# Patient Record
Sex: FEMALE | Race: WHITE
[De-identification: names, ages, dates, MRNs, and addresses within clinical notes are randomized per-mention and may not be internally consistent; named-entity substitution may affect disease eponyms.]

---

## 2021-03-26 ENCOUNTER — HOSPITAL ENCOUNTER (INPATIENT)
Dept: HOSPITAL 63 - GEROPSY | Age: 71
LOS: 28 days | Discharge: NURSING, REHAB OR LONG-TERM CARE | DRG: 57 | End: 2021-04-23
Attending: PSYCHIATRY & NEUROLOGY | Admitting: PSYCHIATRY & NEUROLOGY
Payer: MEDICARE

## 2021-03-26 VITALS — WEIGHT: 109.13 LBS | HEIGHT: 66 IN | BODY MASS INDEX: 17.54 KG/M2

## 2021-03-26 VITALS — SYSTOLIC BLOOD PRESSURE: 104 MMHG | DIASTOLIC BLOOD PRESSURE: 82 MMHG

## 2021-03-26 DIAGNOSIS — F41.1: ICD-10-CM

## 2021-03-26 DIAGNOSIS — Z87.891: ICD-10-CM

## 2021-03-26 DIAGNOSIS — F63.9: ICD-10-CM

## 2021-03-26 DIAGNOSIS — G30.9: Primary | ICD-10-CM

## 2021-03-26 DIAGNOSIS — G91.2: ICD-10-CM

## 2021-03-26 DIAGNOSIS — F01.51: ICD-10-CM

## 2021-03-26 DIAGNOSIS — F90.9: ICD-10-CM

## 2021-03-26 DIAGNOSIS — F02.81: ICD-10-CM

## 2021-03-26 DIAGNOSIS — G10: ICD-10-CM

## 2021-03-26 DIAGNOSIS — E03.9: ICD-10-CM

## 2021-03-26 DIAGNOSIS — Z20.822: ICD-10-CM

## 2021-03-26 DIAGNOSIS — Z79.899: ICD-10-CM

## 2021-03-26 DIAGNOSIS — Z59.0: ICD-10-CM

## 2021-03-26 DIAGNOSIS — F31.60: ICD-10-CM

## 2021-03-26 DIAGNOSIS — R29.6: ICD-10-CM

## 2021-03-26 DIAGNOSIS — E05.90: ICD-10-CM

## 2021-03-26 DIAGNOSIS — J43.9: ICD-10-CM

## 2021-03-26 PROCEDURE — 82306 VITAMIN D 25 HYDROXY: CPT

## 2021-03-26 PROCEDURE — 84436 ASSAY OF TOTAL THYROXINE: CPT

## 2021-03-26 PROCEDURE — 83550 IRON BINDING TEST: CPT

## 2021-03-26 PROCEDURE — 80061 LIPID PANEL: CPT

## 2021-03-26 PROCEDURE — 80164 ASSAY DIPROPYLACETIC ACD TOT: CPT

## 2021-03-26 PROCEDURE — 87086 URINE CULTURE/COLONY COUNT: CPT

## 2021-03-26 PROCEDURE — 84480 ASSAY TRIIODOTHYRONINE (T3): CPT

## 2021-03-26 PROCEDURE — 86592 SYPHILIS TEST NON-TREP QUAL: CPT

## 2021-03-26 PROCEDURE — 82607 VITAMIN B-12: CPT

## 2021-03-26 PROCEDURE — 83036 HEMOGLOBIN GLYCOSYLATED A1C: CPT

## 2021-03-26 PROCEDURE — 85025 COMPLETE CBC W/AUTO DIFF WBC: CPT

## 2021-03-26 PROCEDURE — 85379 FIBRIN DEGRADATION QUANT: CPT

## 2021-03-26 PROCEDURE — 81001 URINALYSIS AUTO W/SCOPE: CPT

## 2021-03-26 PROCEDURE — 85027 COMPLETE CBC AUTOMATED: CPT

## 2021-03-26 PROCEDURE — U0003 INFECTIOUS AGENT DETECTION BY NUCLEIC ACID (DNA OR RNA); SEVERE ACUTE RESPIRATORY SYNDROME CORONAVIRUS 2 (SARS-COV-2) (CORONAVIRUS DISEASE [COVID-19]), AMPLIFIED PROBE TECHNIQUE, MAKING USE OF HIGH THROUGHPUT TECHNOLOGIES AS DESCRIBED BY CMS-2020-01-R: HCPCS

## 2021-03-26 PROCEDURE — 83735 ASSAY OF MAGNESIUM: CPT

## 2021-03-26 PROCEDURE — 70450 CT HEAD/BRAIN W/O DYE: CPT

## 2021-03-26 PROCEDURE — 84443 ASSAY THYROID STIM HORMONE: CPT

## 2021-03-26 PROCEDURE — 80053 COMPREHEN METABOLIC PANEL: CPT

## 2021-03-26 PROCEDURE — 83540 ASSAY OF IRON: CPT

## 2021-03-26 PROCEDURE — 36415 COLL VENOUS BLD VENIPUNCTURE: CPT

## 2021-03-26 PROCEDURE — 84439 ASSAY OF FREE THYROXINE: CPT

## 2021-03-26 PROCEDURE — 87077 CULTURE AEROBIC IDENTIFY: CPT

## 2021-03-26 SDOH — ECONOMIC STABILITY - HOUSING INSECURITY: HOMELESSNESS: Z59.0

## 2021-03-26 NOTE — NUR
Admission Note with Justification for Admission to [facility]

Patient admitted to [facility] for protective oversight for emergency stabilization of acute 
psychiatric crisis.



Pt admitted from: [facility]



Mode of arrival: Facility Transport



Accompanied By: Transport staff



Precipitating behaviors that initiated intake and admission: pt belligerent, refusing 
cares/showers, becoming combative and swinging at staff, agitated, anxious, verbally 
abusive, and name calling.



Description of failure of out patient attempts at stabilization in previous setting list 
behavior and medication trials: "staff distances themselves" from pt according to intake, 
outpatient psych.



Behaviors and assessment findings upon admission: Pt agitated, resistive with staff attempts 
to provide care. Pt incontinent upon arrival and when staff was attempting to provide care, 
pt kicking and attempting to strike staff. Once pt was escorted to the bathroom, it was 
noted that pt brief was completely saturated with urine and feces. Pt had to be escorted to 
the shower in order to provide tate care d/t her being so uncooperative. Staff x3 needed in 
order to complete shower and dressing. Afterwards, pt escorted to [facility] for 
de-escalation and Dr. Freddie lange for new orders.



Plan: Admit for protective oversight for adjustment and stabilization of medications, 
behaviors and mood.  Intense treatment regimen including groups, medication adjustments, 
therapy, consistent regimen for ADL's, self care, and sleep hygiene. Daily monitoring by 
Inpatient staff, Psychiatry, and Medical Physician.

## 2021-03-27 VITALS — SYSTOLIC BLOOD PRESSURE: 100 MMHG | DIASTOLIC BLOOD PRESSURE: 66 MMHG

## 2021-03-27 LAB
ALBUMIN SERPL-MCNC: 3.6 G/DL (ref 3.4–5)
ALBUMIN/GLOB SERPL: 0.8 {RATIO} (ref 1–1.7)
ALP SERPL-CCNC: 97 U/L (ref 46–116)
ALT SERPL-CCNC: 22 U/L (ref 14–59)
AMORPH SED URNS QL MICRO: PRESENT /HPF
ANION GAP SERPL CALC-SCNC: 12 MMOL/L (ref 6–14)
APTT PPP: (no result) S
AST SERPL-CCNC: 16 U/L (ref 15–37)
BACTERIA #/AREA URNS HPF: (no result) /HPF
BASOPHILS # BLD AUTO: 0 X10^3/UL (ref 0–0.2)
BASOPHILS NFR BLD: 0 % (ref 0–3)
BILIRUB SERPL-MCNC: 0.4 MG/DL (ref 0.2–1)
BILIRUB UR QL STRIP: (no result)
BUN/CREAT SERPL: 24 (ref 6–20)
CA-I SERPL ISE-MCNC: 24 MG/DL (ref 7–20)
CALCIUM SERPL-MCNC: 10 MG/DL (ref 8.5–10.1)
CHLORIDE SERPL-SCNC: 111 MMOL/L (ref 98–107)
CHOLEST/HDLC SERPL: 4 {RATIO}
CO2 SERPL-SCNC: 25 MMOL/L (ref 21–32)
CREAT SERPL-MCNC: 1 MG/DL (ref 0.6–1)
EOSINOPHIL NFR BLD: 0.1 X10^3/UL (ref 0–0.7)
EOSINOPHIL NFR BLD: 2 % (ref 0–3)
ERYTHROCYTE [DISTWIDTH] IN BLOOD BY AUTOMATED COUNT: 13.8 % (ref 11.5–14.5)
FIBRINOGEN PPP-MCNC: (no result) MG/DL
GFR SERPLBLD BASED ON 1.73 SQ M-ARVRAT: 54.8 ML/MIN
GLOBULIN SER-MCNC: 4.3 G/DL (ref 2.2–3.8)
GLUCOSE SERPL-MCNC: 93 MG/DL (ref 70–99)
GLUCOSE UR STRIP-MCNC: (no result) MG/DL
HCT VFR BLD CALC: 44.2 % (ref 36–47)
HDLC SERPL-MCNC: 52 MG/DL (ref 40–60)
HGB BLD-MCNC: 14.2 G/DL (ref 12–15.5)
LDLC: 144 MG/DL (ref 0–100)
LYMPHOCYTES # BLD: 2.3 X10^3/UL (ref 1–4.8)
LYMPHOCYTES NFR BLD AUTO: 27 % (ref 24–48)
MAGNESIUM SERPL-MCNC: 1.8 MG/DL (ref 1.8–2.4)
MCH RBC QN AUTO: 31 PG (ref 25–35)
MCHC RBC AUTO-ENTMCNC: 32 G/DL (ref 31–37)
MCV RBC AUTO: 97 FL (ref 79–100)
MONO #: 0.6 X10^3/UL (ref 0–1.1)
MONOCYTES NFR BLD: 7 % (ref 0–9)
NEUT #: 5.5 X10^3UL (ref 1.8–7.7)
NEUTROPHILS NFR BLD AUTO: 64 % (ref 31–73)
NITRITE UR QL STRIP: (no result)
PLATELET # BLD AUTO: 223 X10^3/UL (ref 140–400)
POTASSIUM SERPL-SCNC: 4.1 MMOL/L (ref 3.5–5.1)
PROT SERPL-MCNC: 7.9 G/DL (ref 6.4–8.2)
RBC # BLD AUTO: 4.54 X10^6/UL (ref 3.5–5.4)
RBC #/AREA URNS HPF: (no result) /HPF (ref 0–2)
SODIUM SERPL-SCNC: 148 MMOL/L (ref 136–145)
SP GR UR STRIP: 1.02
SQUAMOUS #/AREA URNS LPF: (no result) /LPF
TRIGL SERPL-MCNC: 87 MG/DL (ref 0–150)
UROBILINOGEN UR-MCNC: 0.2 MG/DL
VLDLC: 17 MG/DL (ref 0–40)
WBC # BLD AUTO: 8.6 X10^3/UL (ref 4–11)
WBC #/AREA URNS HPF: (no result) /HPF (ref 0–4)

## 2021-03-27 RX ADMIN — LEVOTHYROXINE SODIUM SCH MCG: 88 TABLET ORAL at 04:40

## 2021-03-27 RX ADMIN — OXYCODONE HYDROCHLORIDE AND ACETAMINOPHEN SCH MG: 500 TABLET ORAL at 08:22

## 2021-03-27 RX ADMIN — BUSPIRONE HYDROCHLORIDE SCH MG: 5 TABLET ORAL at 19:34

## 2021-03-27 RX ADMIN — QUETIAPINE FUMARATE SCH MG: 50 TABLET, FILM COATED ORAL at 08:22

## 2021-03-27 RX ADMIN — NICOTINE SCH PATCH: 14 PATCH, EXTENDED RELEASE TOPICAL at 08:22

## 2021-03-27 RX ADMIN — BUSPIRONE HYDROCHLORIDE SCH MG: 5 TABLET ORAL at 08:22

## 2021-03-27 RX ADMIN — QUETIAPINE FUMARATE SCH MG: 50 TABLET, FILM COATED ORAL at 19:34

## 2021-03-27 RX ADMIN — MULTIPLE VITAMINS W/ MINERALS TAB SCH TAB: TAB at 08:22

## 2021-03-27 RX ADMIN — TRAZODONE HYDROCHLORIDE PRN MG: 50 TABLET, FILM COATED ORAL at 19:35

## 2021-03-27 RX ADMIN — BUSPIRONE HYDROCHLORIDE SCH MG: 5 TABLET ORAL at 12:16

## 2021-03-27 NOTE — NUR
Patient took medications whole, also is calm while doing blood draw. Patient has rapid 
abnormal body movements that appears unwilling. Dr. Benjamin paged for consult.

## 2021-03-27 NOTE — NUR
Pt sitting quietly in the hallway when approached. Pt calm, disorganized, and delusional- 
she reports that she is leaving tonight. Pt mood extremely labile, she is very restless with 
severe ataxia. Pt cooperative with assessment and compliant with medications administered 
whole, however resistive and uncooperative when staff attempted to escort her to her room to 
obtain an EKG.

## 2021-03-27 NOTE — PSYEV
DATE OF SERVICE:  03/26/2021



PSYCHIATRIC EVALUATION



SUBJECTIVE:  The patient was seen today, met with the staff, and also covering

for Dr. Frazier.  The patient was admitted from [facility] because of increased

agitation, aggression, combative, and striking out at staff.  She also has been

verbally abusive, belligerent, refusing to take showers.



CHIEF COMPLAINT:  "I don't know why I am here".



HISTORY OF PRESENT ILLNESS:  The patient apparently has been a resident at

[facility] and she has multiple problems including physical and also

psychiatric issues including dementia, gait problems and also involuntary

movements.  The patient is unable to sit still.  The patient is also having

increased __ involuntary movements and also involuntary movements of upper and

lower extremities.  The patient also has explosive speech.  The patient during

the assessment was pleasant, did not have any major complaints.  The patient is

having difficulty communicating her feelings, appears to be pleasant, denies of

depressed, admits to increased anxiety and also lacking insight to her problems.

 The patient also has a history of delusional behaviors in the past.



PAST MEDICAL HISTORY:  History of emphysema, gait impairment, hypothyroidism and

involuntary movements and movement disorder.



PAST PSYCHIATRIC HISTORY:  The patient was not able to give any information with

regard to her past whether she has been treated in psychiatric facility or not,

but she carries a diagnosis of dementia, Alzheimer's type, generalized anxiety

disorder and also bipolar disorder.



PSYCHOSOCIAL HISTORY:  She has been a resident at the nursing home for several

years.  Apparently, she has one son.  The patient has also history of smoking

and no evidence of any alcoholism or substance abuse.  The patient was not able

to talk about her family.



MENTAL STATUS EXAMINATION:  The patient appeared to be of her stated age,

pleasant, somewhat confused, but able to make eye contact.  The patient had

considerable difficulty with her speech because of her involuntary movements. 

She has an explosive speech, able to answer a few questions.  She knew that this

is March and the year 2021 and she knew she was in the hospital, but could not

remember the name except for stating "Alexy".  The patient was able to sit

through the assessment, but not able to give much information.  The patient gets

excited easily, pressured speech, racing thoughts.  Affect and mood showed she

is not depressed, but is anxious, increased psychomotor activity.  She denies of

any suicidal thoughts, currently not exhibiting any psychotic symptoms.  The

patient is oriented to surroundings, knew she was in the hospital and knew the

month and year.  The patient's memory is not testable because of the patient's

inability to answer questions.  The patient's judgment is fair and insight

limited.



DIAGNOSTIC IMPRESSION:

AXIS I:

1.  Bipolar disorder, mixed.

2.  Dementia, mild unspecified.

3.  Generalized anxiety disorder.

AXIS II:  None.

AXIS III:  Emphysema, hypothyroidism, movement disorder.



STRENGTHS:  Pleasant, able to walk without support.



WEAKNESSES:  The patient has limitations with regard to her communication and

also movements because of the movement disorder.



INITIAL TREATMENT PLAN:  The patient will be under observation.  The patient

will continue on her current medications including BuSpar 5 mg t.i.d., Seroquel

50 mg b.i.d., Zyprexa 2.5 mg p.r.n. q. 2 hours and also trazodone 50 mg at night

p.r.n.  The patient is encouraged to attend all the activities, including

individual and group counseling activities.



LENGTH OF STAY:  7 days.



DISCHARGE CRITERIA:  The patient at least to show some improvement without any

major behavior problems for 3 consecutive days and will return to the nursing

home that she came from.





______________________________

DARLINE SHERMAN MD DR:  ERNESTO/willow  JOB#:  [id number] / [id number]

DD:  03/27/2021 16:35  DT:  03/27/2021 18:04

USMAN

## 2021-03-28 VITALS — DIASTOLIC BLOOD PRESSURE: 57 MMHG | SYSTOLIC BLOOD PRESSURE: 93 MMHG

## 2021-03-28 VITALS — SYSTOLIC BLOOD PRESSURE: 111 MMHG | DIASTOLIC BLOOD PRESSURE: 53 MMHG

## 2021-03-28 LAB
HBA1C MFR BLD: 5.4 % (ref 4.8–5.6)
T3 SERPL-MCNC: 117 NG/DL (ref 71–180)
T4 SERPL-MCNC: 10.3 UG/DL (ref 4.5–12)

## 2021-03-28 RX ADMIN — NICOTINE SCH PATCH: 14 PATCH, EXTENDED RELEASE TOPICAL at 08:36

## 2021-03-28 RX ADMIN — QUETIAPINE FUMARATE SCH MG: 50 TABLET, FILM COATED ORAL at 21:02

## 2021-03-28 RX ADMIN — TRAZODONE HYDROCHLORIDE PRN MG: 50 TABLET, FILM COATED ORAL at 21:02

## 2021-03-28 RX ADMIN — BUSPIRONE HYDROCHLORIDE SCH MG: 5 TABLET ORAL at 15:07

## 2021-03-28 RX ADMIN — BUSPIRONE HYDROCHLORIDE SCH MG: 5 TABLET ORAL at 08:36

## 2021-03-28 RX ADMIN — MULTIPLE VITAMINS W/ MINERALS TAB SCH TAB: TAB at 08:36

## 2021-03-28 RX ADMIN — LEVOTHYROXINE SODIUM SCH MCG: 88 TABLET ORAL at 05:21

## 2021-03-28 RX ADMIN — BUSPIRONE HYDROCHLORIDE SCH MG: 5 TABLET ORAL at 21:02

## 2021-03-28 RX ADMIN — OXYCODONE HYDROCHLORIDE AND ACETAMINOPHEN SCH MG: 500 TABLET ORAL at 08:36

## 2021-03-28 RX ADMIN — QUETIAPINE FUMARATE SCH MG: 50 TABLET, FILM COATED ORAL at 08:36

## 2021-03-28 NOTE — PN
DATE:  03/28/2021



SUBJECTIVE:  The patient was seen today, met with the staff, chart reviewed and

also covering for Dr. Frazier.  The patient's behavior remains the same.  She is

hyperactive, restless, and generalized involuntary movements.  The patient also

has significant speech impediment.  The patient was seen by the neurologist

today and will be requesting a CT scan, but the patient may have difficulty

going through the procedure because of her continuous involuntary movements.



OBSERVATION:  Temperature 97.8, blood pressure 111/53, pulse 95, respirations

18, O2 sat 99%.  Slept about 4 hours last night.



CURRENT MEDICATIONS:  Include Seroquel 50 mg twice a day, BuSpar 5 mg t.i.d. and

trazodone 50 mg at night p.r.n. for sleep.  She is also on olanzapine 2.5 mg q.2

hours p.r.n. for psychosis.



LABORATORY DATA:  The patient's labs were reviewed.



ASSESSMENT:

1.  Bipolar disorder, mixed.

2.  Dementia, mild, unspecified.

3.  Generalized anxiety disorder.



PLAN:  To continue with the current treatment.



LENGTH OF STAY:  10 days.





______________________________

DARLINE SHERMAN MD



DR:  ERNESTO/willow  JOB#:  [id number] / [id number]

DD:  03/28/2021 16:19  DT:  03/28/2021 17:05

USMAN

## 2021-03-28 NOTE — NUR
PT in room being changed in bed at time of assessment and medication administration. PT 
noted with new reddened/scabbed area noted to RT lateral lower buttock. Continued noted 
reddened/scabbed area to coccyx. Barrier cream applied. PT compliant with assessment and 
medications. PT took whole with water. Photo taken but PT moving constantly, unable to use 
stickers.

## 2021-03-28 NOTE — NUR
Pt has low TSH level of 0.018. Am Levothyroxine 88mcg held at this time and pt added to MD 
list for today in order to address. Will pass on to the next shift.

## 2021-03-28 NOTE — NUR
PT IS LOCATED IN DINING ROOM AT TIME OF ASSESSMENT AND MEDICATION ADMINISTRATION. PT IS 
PLEASANT AND COOPERATIVE. PT DOES HAVE VAHE SPASTIC MOVEMENT. PT HAS A CT SCHEDULE FOR THIS 
AM. WILL CONTINUE TO MONITOR.

## 2021-03-29 VITALS — SYSTOLIC BLOOD PRESSURE: 116 MMHG | DIASTOLIC BLOOD PRESSURE: 62 MMHG

## 2021-03-29 RX ADMIN — MULTIPLE VITAMINS W/ MINERALS TAB SCH TAB: TAB at 10:32

## 2021-03-29 RX ADMIN — TRAZODONE HYDROCHLORIDE PRN MG: 50 TABLET, FILM COATED ORAL at 21:00

## 2021-03-29 RX ADMIN — OXYCODONE HYDROCHLORIDE AND ACETAMINOPHEN SCH MG: 500 TABLET ORAL at 10:32

## 2021-03-29 RX ADMIN — NICOTINE SCH PATCH: 14 PATCH, EXTENDED RELEASE TOPICAL at 10:36

## 2021-03-29 RX ADMIN — BUSPIRONE HYDROCHLORIDE SCH MG: 5 TABLET ORAL at 21:00

## 2021-03-29 RX ADMIN — BUSPIRONE HYDROCHLORIDE SCH MG: 5 TABLET ORAL at 14:00

## 2021-03-29 RX ADMIN — LEVOTHYROXINE SODIUM SCH MCG: 50 TABLET ORAL at 05:48

## 2021-03-29 RX ADMIN — AMOXICILLIN SCH MG: 250 CAPSULE ORAL at 21:00

## 2021-03-29 RX ADMIN — BUSPIRONE HYDROCHLORIDE SCH MG: 5 TABLET ORAL at 10:32

## 2021-03-29 RX ADMIN — QUETIAPINE FUMARATE SCH MG: 50 TABLET, FILM COATED ORAL at 21:00

## 2021-03-29 RX ADMIN — LEVOTHYROXINE SODIUM SCH MCG: 50 TABLET ORAL at 05:39

## 2021-03-29 RX ADMIN — QUETIAPINE FUMARATE SCH MG: 50 TABLET, FILM COATED ORAL at 10:32
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
## 2021-03-29 NOTE — NUR
PATIENT WAS AWAKE IN A DAY ROOM EATING HER BREAKFAST UPON ASSESSMENT, COMPLIANT WITH 
MEDICATIONS. PATIENT HAS HYPERACTIVE ABNORMAL MOVEMENTS, ABLE TO AMBULATE AND FEED SELF 
UNDER SUPERVISION. PATIENT IS GETTING IMPULSIVE AND IRRITABLE AT TIMES. PATIENT WENT TO HER 
ROOM, ASKED ABOUT HAVING TV IN A ROOM, PATIENT WAS INFORMED NO TV AVAILABLE IN ANY ROOM 
EXCEPT THE DAY ROOM. PATIENT REFUSED TO GO TO THE DAY ROOM. PATIENT IS CURRENTLY IN A BED 
RESTING.

## 2021-03-29 NOTE — NUR
Patient has been provided with Practical Counseling for tobacco cessation. It included a 
face to face interaction and the following was discussed: Recognizing danger situations, 
Developing coping skills,Basic cessation information. Will follow for discharge needs and 
discharge planning. Therapist could never do a smoking cessation on the patient.  We have 
asked nurse to help with this situation.  Patient was non compliant a couple of times that 
different therapist have tried
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
## 2021-03-29 NOTE — NUR
ACTIVITY THERAPY ASSESSMENT

completed based on notes, observation and interview. Pt was laying in her bed with her face 
in the pillow. Pt was resistive and 

unwilling to answer questions. Pt did answer a couple of questions but it was inaudible as 
she had her face in the pillow and 

became agitated when AT would ask her to repeat it. AT is unsure if pt became agitated and 
pt  intentionally made a fast 

movement towards AT as she raised her voice or if it may have been involuntary. Per notes pt 
has appeared to make involuntary

movements sense admission. Per notes pt likes crafts, ceramics, interior decorating and 
painting. Pt has a history of alcohol abuse and inability to take care of herself. Initial 
goal aimed to increase socialization and engagement. Pt will participate in at least one 
individual or group Activity Therapy session before discharge.

-------------------------------------------------------------------------------

Addendum: 04/05/21 at 1317 by RICKY ENWMAN ACT

-------------------------------------------------------------------------------

Goal changed 4/5: Pt will participate in at least three individual or group Activity Therapy 
session per week.

-------------------------------------------------------------------------------

Addendum: 04/19/21 at 1359 by RICKY NEWMAN ACT

-------------------------------------------------------------------------------

Goal repeated 4/19

## 2021-03-29 NOTE — NUR
WEEKLY ACTIVITY THERAPY NOTE

Date of Admission: 3/26/21

Date of AT Assessment: TBD

Precipitating behaviors that initiated intake and admission: pt belligerent, refusing 
cares/showers, becoming combative and swinging at staff, agitated, anxious, verbally 
abusive, and name calling.

Goal aimed: TBD

Initial Goal: TBD

Weekly progress towards goal: NA

Group participation level: NA

Weekly highlights: arrived on [facility]

Behaviors observed: 

Plan: meet/assess pt

Beneficial adaptations:

## 2021-03-30 VITALS — SYSTOLIC BLOOD PRESSURE: 117 MMHG | DIASTOLIC BLOOD PRESSURE: 75 MMHG

## 2021-03-30 VITALS — SYSTOLIC BLOOD PRESSURE: 120 MMHG | DIASTOLIC BLOOD PRESSURE: 73 MMHG

## 2021-03-30 RX ADMIN — BUSPIRONE HYDROCHLORIDE SCH MG: 5 TABLET ORAL at 19:43

## 2021-03-30 RX ADMIN — AMOXICILLIN SCH MG: 250 CAPSULE ORAL at 09:32

## 2021-03-30 RX ADMIN — AMOXICILLIN SCH MG: 250 CAPSULE ORAL at 14:08

## 2021-03-30 RX ADMIN — OXYCODONE HYDROCHLORIDE AND ACETAMINOPHEN SCH MG: 500 TABLET ORAL at 09:31

## 2021-03-30 RX ADMIN — BUSPIRONE HYDROCHLORIDE SCH MG: 5 TABLET ORAL at 09:31

## 2021-03-30 RX ADMIN — QUETIAPINE FUMARATE SCH MG: 50 TABLET, FILM COATED ORAL at 09:31

## 2021-03-30 RX ADMIN — MULTIPLE VITAMINS W/ MINERALS TAB SCH TAB: TAB at 09:31

## 2021-03-30 RX ADMIN — AMOXICILLIN SCH MG: 250 CAPSULE ORAL at 19:42

## 2021-03-30 RX ADMIN — BUSPIRONE HYDROCHLORIDE SCH MG: 5 TABLET ORAL at 14:09

## 2021-03-30 RX ADMIN — TRAZODONE HYDROCHLORIDE PRN MG: 50 TABLET, FILM COATED ORAL at 19:44

## 2021-03-30 RX ADMIN — NICOTINE SCH PATCH: 14 PATCH, EXTENDED RELEASE TOPICAL at 09:00

## 2021-03-30 RX ADMIN — QUETIAPINE FUMARATE SCH MG: 50 TABLET, FILM COATED ORAL at 19:42

## 2021-03-30 RX ADMIN — LEVOTHYROXINE SODIUM SCH MCG: 50 TABLET ORAL at 05:11

## 2021-03-30 NOTE — NUR
Nursing Note:  Pt sitting on side of bed during assessment and med pass.  Pt pleasant and 
compliant with medications.

## 2021-03-30 NOTE — PN
DATE:  03/29/2021



SUBJECTIVE:  The patient was seen today, met with the staff, chart reviewed,

also covering for Dr. Frazier.  The patient continues to exhibit significant

involuntary movements and also staff reports the patient has not had any

long-term exposure to neuroleptics because she has been exhibiting significant

involuntary movements and also orofacial dyskinesia.  The patient also has been

homeless in the past and history of alcoholism.  The patient apparently saw a

neurologist in [place] and also had an MRI and we are requesting for the

reports.



OBSERVATION:

VITAL SIGNS:  Temperature 97, pulse 69, respirations 24, O2 sat 95%.

GENERAL:  Slept about 7 hours last night.  The patient's appetite is fair.



MEDICATIONS:  The patient's current medications include Seroquel 50 mg twice a

day, BuSpar 5 mg t.i.d. and trazodone 50 mg at night p.r.n.



LAB REVIEW:  No significant change from prior readings.



ASSESSMENT:

1.  Bipolar disorder, mixed.

2.  Dementia, mild unspecified.

3.  Generalized anxiety disorder.



PLAN:  To continue with the treatment.



LENGTH OF STAY:  Ten days.





______________________________

DARLINE SHERMNA MD



DR:  ERNESTO/willow  JOB#:  [id number] / [id number]

DD:  03/29/2021 17:01  DT:  03/30/2021 00:25

## 2021-03-30 NOTE — NUR
Patient has been calm, compliant, pleasant, interactive, and social throughout this shift.  
She spent some time withdrawn to her room in the morning, otherwise spent most of the shift 
in the day room.  She was social with peers and staff.  Patient has garbled speech and 
irregular movements similar to Curlew's.  Will continue to monitor and report to 
oncoming shift.

## 2021-03-30 NOTE — NUR
Nursing Note

Pt in day room, has a severe ataxic gait, flails arms around walks around the day room with 
a specific pattern to her gait.  Pt states her name is Sofía, but then trails off and 
rambling.  Med compliant and cooperative.

## 2021-03-31 VITALS — DIASTOLIC BLOOD PRESSURE: 53 MMHG | SYSTOLIC BLOOD PRESSURE: 88 MMHG

## 2021-03-31 VITALS — SYSTOLIC BLOOD PRESSURE: 127 MMHG | DIASTOLIC BLOOD PRESSURE: 82 MMHG

## 2021-03-31 RX ADMIN — BUSPIRONE HYDROCHLORIDE SCH MG: 5 TABLET ORAL at 08:41

## 2021-03-31 RX ADMIN — AMOXICILLIN SCH MG: 250 CAPSULE ORAL at 08:41

## 2021-03-31 RX ADMIN — OXYCODONE HYDROCHLORIDE AND ACETAMINOPHEN SCH MG: 500 TABLET ORAL at 08:41

## 2021-03-31 RX ADMIN — BUSPIRONE HYDROCHLORIDE SCH MG: 5 TABLET ORAL at 19:51

## 2021-03-31 RX ADMIN — LEVOTHYROXINE SODIUM SCH MCG: 50 TABLET ORAL at 05:40

## 2021-03-31 RX ADMIN — AMOXICILLIN SCH MG: 250 CAPSULE ORAL at 19:51

## 2021-03-31 RX ADMIN — QUETIAPINE FUMARATE SCH MG: 50 TABLET, FILM COATED ORAL at 08:41

## 2021-03-31 RX ADMIN — TRAZODONE HYDROCHLORIDE PRN MG: 50 TABLET, FILM COATED ORAL at 19:54

## 2021-03-31 RX ADMIN — QUETIAPINE FUMARATE SCH MG: 50 TABLET, FILM COATED ORAL at 19:51

## 2021-03-31 RX ADMIN — BUSPIRONE HYDROCHLORIDE SCH MG: 5 TABLET ORAL at 14:03

## 2021-03-31 RX ADMIN — AMOXICILLIN SCH MG: 250 CAPSULE ORAL at 14:03

## 2021-03-31 RX ADMIN — MULTIPLE VITAMINS W/ MINERALS TAB SCH TAB: TAB at 08:41

## 2021-03-31 RX ADMIN — NICOTINE SCH PATCH: 14 PATCH, EXTENDED RELEASE TOPICAL at 08:42

## 2021-03-31 NOTE — NUR
Patient has been agitated, resistive to medications, with outbursts of anger today.  She was 
yelling loudly at about lunch time saying that she did not want to stay in the hospital. She 
was instructed to return to her room as she was upsetting other patients, she refused.  When 
staff attempted to escort her to her room, she lay on the floor and was combative, hitting 
and kicking staff.  PRN medication provided per eMAR and patient was escorted to her room.  
Patient stayed in her room and was cooperative with 14:00 medications, then suddenly through 
the water cup and started bouncing up and down on her bed, swinging her arms and kicking 
out.  Patient stayed in her room until later when she was sitting calmly in the [address] 
during MD's rounds.  Will continue to monitor and report to oncoming staff.

## 2021-03-31 NOTE — NUR
Nursing Note

Pt initially was compliant with assessment but soon became irritable.  Pt using extremely 
foul language toward nursing, when refusing her meds.  She used multiple profane terms to 
describe me when she realized I was taking her power aid from her.  Pt sits at the  
desk window continuing to swear and be belligerent. Went to bed with no meds.

## 2021-03-31 NOTE — PN
DATE:  03/30/2021



SUBJECTIVE:  The patient was seen today, met with the staff, chart reviewed and

also covering for Dr. Frazier.  The patient is exhibiting increased agitation,

aggression, and combative behaviors including striking at staff:



OBSERVATION: 

VITAL SIGNS:  Temperature 97.7, blood pressure 120/73, respirations 18, pulse

78, O2 sat 96%.

GENERAL:  Slept about 7 hours last night.  The patient continues to have

increased involuntary movements and was seen by the neurologist.



CURRENT MEDICATIONS:  Include  Seroquel 50 mg twice a day, BuSpar 5 mg t.i.d.,

trazodone 50 mg at night, and also olanzapine 2.5 mg q.2 hours p.r.n.



The patient is not having any other side effects.  According to the reports, the

patient has been having involuntary movements for a long period of time. 

Apparently, there is no indication that she has been exposed to long-term

neuroleptics.



ASSESSMENT:

1.  Bipolar disorder, mixed.

2.  Dementia, mild unspecified.

3.  Generalized anxiety disorder.

4.  History of alcohol dependence.



PLAN:  To continue with treatment.



LENGTH OF STAY:  7-10 days.





______________________________

DARLINE SHERMAN MD DR:  ERNESTO/willow  JOB#:  [id number] / [id number]

DD:  03/30/2021 17:16  DT:  03/30/2021 23:10

## 2021-04-01 VITALS — DIASTOLIC BLOOD PRESSURE: 75 MMHG | SYSTOLIC BLOOD PRESSURE: 129 MMHG

## 2021-04-01 RX ADMIN — AMOXICILLIN SCH MG: 250 CAPSULE ORAL at 21:00

## 2021-04-01 RX ADMIN — BUSPIRONE HYDROCHLORIDE SCH MG: 5 TABLET ORAL at 21:00

## 2021-04-01 RX ADMIN — MULTIPLE VITAMINS W/ MINERALS TAB SCH TAB: TAB at 08:23

## 2021-04-01 RX ADMIN — BUSPIRONE HYDROCHLORIDE SCH MG: 5 TABLET ORAL at 08:22

## 2021-04-01 RX ADMIN — BUSPIRONE HYDROCHLORIDE SCH MG: 5 TABLET ORAL at 19:58

## 2021-04-01 RX ADMIN — Medication SCH CAP: at 13:47

## 2021-04-01 RX ADMIN — AMOXICILLIN SCH MG: 250 CAPSULE ORAL at 08:23

## 2021-04-01 RX ADMIN — AMOXICILLIN SCH MG: 250 CAPSULE ORAL at 13:47

## 2021-04-01 RX ADMIN — Medication SCH CAP: at 21:00

## 2021-04-01 RX ADMIN — AMOXICILLIN SCH MG: 250 CAPSULE ORAL at 19:58

## 2021-04-01 RX ADMIN — LEVOTHYROXINE SODIUM SCH MCG: 50 TABLET ORAL at 05:38

## 2021-04-01 RX ADMIN — OXYCODONE HYDROCHLORIDE AND ACETAMINOPHEN SCH MG: 500 TABLET ORAL at 08:23

## 2021-04-01 RX ADMIN — QUETIAPINE FUMARATE SCH MG: 50 TABLET, FILM COATED ORAL at 19:58

## 2021-04-01 RX ADMIN — BUSPIRONE HYDROCHLORIDE SCH MG: 5 TABLET ORAL at 13:47

## 2021-04-01 RX ADMIN — Medication SCH CAP: at 19:58

## 2021-04-01 RX ADMIN — QUETIAPINE FUMARATE SCH MG: 50 TABLET, FILM COATED ORAL at 08:22

## 2021-04-01 RX ADMIN — NICOTINE SCH PATCH: 14 PATCH, EXTENDED RELEASE TOPICAL at 08:23

## 2021-04-01 RX ADMIN — QUETIAPINE FUMARATE SCH MG: 50 TABLET, FILM COATED ORAL at 21:00
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
## 2021-04-01 NOTE — RAD
CT brain without contrast.



HISTORY: Evaluate for hydrocephalus, patient appears to have choreatic movements





CT scan of brain was done without contrast. There is motion artifact. There is no intracranial hemorr
rebecca or subdural hematoma. Motion artifact limits the scan. There is no shift of the midline. CVA is 
not identified. Patient has hydrocephalus. I do not have an old study for comparison. Sinuses are sarath
ar.



IMPRESSION:

1. Limited study due to motion artifact.

2. Hydrocephalus.

3. No intracranial hemorrhage.

















PQRS Compliance Statement:



One or more of the following individualized dose reduction techniques were utilized for this examinat
ion:  

1. Automated exposure control  

2. Adjustment of the mA and/or kV according to patient size  

3. Use of iterative reconstruction technique



Electronically signed by: David De Leon MD (4/1/2021 7:55 AM) [id number]

## 2021-04-01 NOTE — PN
DATE:  03/30/2021



SUBJECTIVE:  The patient was seen today, met with the staff, chart reviewed and

also covering for Dr. Frazier.  Staff reports compliant with meds and assessment,

pleasant, interacting with the staff.



OBSERVATION:

VITAL SIGNS:  Temperature 97.0, blood pressure 86/56, pulse 83, respirations 16,

O2 sat 95%.



CURRENT MEDICATIONS:  Include Seroquel 50 mg twice a day, BuSpar 5 mg 3 times a

day, trazodone 50 mg at night and also on olanzapine 2.5 mg q. 2 hours p.r.n.



The patient is not having any side effects to the medications.  The patient

continues to have involuntary movements, generalized.



ASSESSMENT:

1.  Bipolar disorder, mixed.

2.  Dementia, mild unspecified.

3.  Generalized anxiety disorder.

4.  History of alcohol dependence.



PLAN:  To continue with the treatment.



LENGTH OF STAY:  7-10 days.





______________________________

DARLINE SHERMAN MD



DR:  ERNESTO/willow  JOB#:  [id number] / [id number]

DD:  03/31/2021 16:46  DT:  04/01/2021 00:29
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
## 2021-04-01 NOTE — NUR
PT IS LOCATED IN PT ROOM AT TIME OF ASSESSMENT AND MEDICATION ADMINISTRATION. PT IS RESITIVE 
WITH MEDICATIONS AND TOLD THIS NURSE TO GET OUT OF HER ROOM. PT IS STILL RESTING IN BED AT 
THIS TIME. WILL CONTINUE TO MONITOR.

## 2021-04-01 NOTE — PN
DATE:  03/28/2021



SUBJECTIVE:  The patient denies any new medical or neurological complaints. 

However, the patient continued to have agitation and aggressive behavior with

restlessness and hyperactivity.  She continues to have generalized involuntarily

movements of the upper and lower extremities with chorea-like movements.  Head

CT scan was not performed because of continuous involuntarily movements.  She

has not had any falls or head injuries.



OBJECTIVE:

GENERAL:  Well-developed, well-nourished female, not in acute distress.

VITAL SIGNS:  Blood pressure 111/53, respiratory rate 18, pulse is 95,

temperature 97.8, oxygen saturation 99% on room air.

HEENT:  Normocephalic, atraumatic, otherwise unremarkable.

NECK:  Supple.  Negative for carotid bruit, lymphadenopathy or thyromegaly.

LUNGS:  Clear to A and P.

CARDIOVASCULAR:  Regular rate and rhythm, normal S1, S2.

ABDOMEN:  Soft.  Bowel sounds positive.

EXTREMITIES:  Negative for cyanosis, clubbing or edema.

NEUROLOGICAL EXAM:  Mental Status:  The patient is alert to herself.  Speech is

fluent.  There is no language dysfunction.  Memory, judgment, and abstracting

thinking are fair.  The patient denies hallucination or delusion.  Cranial

nerves are intact.  No focal motor deficits; however, the patient continues to

have abnormal involuntarily movements of the upper and lower extremities with

chorea-like movements  Sensory examination revealed normal pinprick, light

touch, vibratory and position senses.  Deep tendon reflexes were symmetric and

hypoactive with absent Achilles responses.  Gait unsteady stance but difficult

to perform tandem gait because of involuntarily movements.



IMPRESSION:

1.  Involuntarily movements of the upper and lower extremities, which are

chorea-type movements.  However, the patient continued to have unsteady gait,

but not dysarthric, any falls or recent head injuries since admission.  The

etiology of involuntarily movement is uncertain.  According to the nursing

staff, the patient did have urinary incontinence.

2.  Dementia of mild severity.  Normocephalic hydrocephalus should be ruled out

as the patient has dementia with gait disturbances and urinary incontinence.

3.  Multiple psychiatric problems include bipolar disorders, anxiety disorders

with behavior disturbances.



RECOMMENDATIONS:  As the patient has been unsteady and involuntarily movements,

head CT scan performance is a challenge, therefore recommended to have a

sedation before the procedure with Valium 5 mg p.o.  Otherwise, continue with

current medical and psychiatric care.





______________________________

M UZIEL CERON MD



DR:  MARLEN/willow  JOB#:  [id number] / [id number]

DD:  03/31/2021 23:36  DT:  04/01/2021 01:03

## 2021-04-02 RX ADMIN — Medication SCH CAP: at 20:23

## 2021-04-02 RX ADMIN — QUETIAPINE FUMARATE SCH MG: 50 TABLET, FILM COATED ORAL at 20:23

## 2021-04-02 RX ADMIN — OXYCODONE HYDROCHLORIDE AND ACETAMINOPHEN SCH MG: 500 TABLET ORAL at 08:35

## 2021-04-02 RX ADMIN — LEVOTHYROXINE SODIUM SCH MCG: 50 TABLET ORAL at 08:35

## 2021-04-02 RX ADMIN — QUETIAPINE FUMARATE SCH MG: 50 TABLET, FILM COATED ORAL at 08:33

## 2021-04-02 RX ADMIN — BUSPIRONE HYDROCHLORIDE SCH MG: 5 TABLET ORAL at 20:23

## 2021-04-02 RX ADMIN — NICOTINE SCH PATCH: 14 PATCH, EXTENDED RELEASE TOPICAL at 08:33

## 2021-04-02 RX ADMIN — MULTIPLE VITAMINS W/ MINERALS TAB SCH TAB: TAB at 08:33

## 2021-04-02 RX ADMIN — BUSPIRONE HYDROCHLORIDE SCH MG: 5 TABLET ORAL at 08:33

## 2021-04-02 RX ADMIN — BUSPIRONE HYDROCHLORIDE SCH MG: 5 TABLET ORAL at 14:00

## 2021-04-02 RX ADMIN — Medication SCH CAP: at 08:34

## 2021-04-02 RX ADMIN — AMOXICILLIN SCH MG: 250 CAPSULE ORAL at 14:00

## 2021-04-02 RX ADMIN — AMOXICILLIN SCH MG: 250 CAPSULE ORAL at 08:34

## 2021-04-02 RX ADMIN — TRAZODONE HYDROCHLORIDE PRN MG: 50 TABLET, FILM COATED ORAL at 20:22

## 2021-04-02 RX ADMIN — AMOXICILLIN SCH MG: 250 CAPSULE ORAL at 20:22

## 2021-04-02 NOTE — NUR
Patient is ambulating around the unit on assumption of care. She is disorganized, confused. 
Compliant with physical assessments but refused to answer orientation questions. Took her 
medications whole without issue. No agitation until it was time for shower and HS cares, 
then patient became belligerent and resistive. Staff assist of 3 required to perform shower, 
and patient spent the whole time yelling and flailing about. She eventually calmed once 
brought back to her room. No further agitation. She does not appear to be experiencing any 
pain or discomfort. Patient appears to be sleeping comfortably at present time. Will 
continue to monitor.

## 2021-04-02 NOTE — NUR
Pt was cooperative at beginning of shift. At 2200 staff gave pt a shower. Pt kicked and 
screamed curse words at staff. It took 3 staff to bathe pt before putting her back to bed. 
After some time in bed the pt calmed down and went to sleep.

## 2021-04-02 NOTE — PN
DATE:  04/01/2021



SUBJECTIVE:  The patient denies any new medical or neurological complaints.  She

has not had any falls recently.  However, she continues to have abnormal

movements of the upper and lower extremity with unsteady gait. 



OBJECTIVE: 

GENERAL:  Well-developed, well-nourished female, not in acute distress. 

VITAL SIGNS:  Blood pressure 129/75, respiratory rate of 22, pulse is 112,

temperature 97.8, oxygen saturation 95% on room air. 

HEENT:  Normocephalic, atraumatic, otherwise unremarkable. 

NECK:  Supple.  Negative for carotid bruit, lymphadenopathy or thyromegaly. 

LUNGS:  Clear to A and P. 

CARDIOVASCULAR:  Regular rate and rhythm, normal S1, S2.  There is no S3, S4 or

murmur. 

ABDOMEN:  Soft.  Bowel sounds positive. 

EXTREMITIES:  Negative for cyanosis, clubbing or edema. 

NEUROLOGIC EXAM:  The patient is alert and oriented to herself and place. 

Speech is fluent.  There is no language dysfunction.  Memory, judgment, and

abstracting thinking are fair.  The patient denies hallucination or delusion. 

Cranial nerves are intact.  No focal motor or sensory deficits; however, the

patient continued to have involuntarily movements as described before.  Deep

tendon reflexes were symmetric and hypoactive with absent Achilles responses. 

Gait is abnormal tandem gait with mild dystonia. 



IMPRESSION: 

1.  Mild dementia with abnormal gait and urinary incontinence suggestive of

normal pressure hydrocephalus. 

2.  Multiple psychiatric problems include bipolar disorder, anxiety disorder. 

3.  Mild dementia. 



RECOMMENDATIONS: 

1.  Continue with current medical and psychiatric care. 

2.  Awaiting head CT scan to rule out ____ hydrocephalus.





______________________________

M UZIEL CERON MD



DR:  MARLEN/willow  JOB#:  [id number] / [id number]

DD:  04/02/2021 07:48  DT:  04/02/2021 07:58

## 2021-04-02 NOTE — PN
DATE:  04/01/2021



SUBJECTIVE:  Staff reports no major changes except she is much calmer today and

received Klonopin 0.5 mg twice a day.  Yesterday ____ but continues to have

involuntary movements when she is awake.



OBSERVATION:

VITAL SIGNS:  Stable.



MEDICATIONS:  The patient's current medications include Seroquel 50 mg twice a

day, BuSpar 5 mg 3 times a day, trazodone 50 mg at night, and olanzapine 2.5 mg

q. 2 hours p.r.n. and also Klonopin 0.5 mg twice a day.  The patient is not

having any side effects to the medications.



ASSESSMENT:

1.  Bipolar disorder.

2.  Dementia, mild, unspecified.

3.  Generalized anxiety disorder.

4.  History of alcohol dependence.



PLAN:  Continue treatment.



LENGTH OF STAY:  7 days.





______________________________

DARLINE SHERMAN MD



DR:  ERNESTO/willow  JOB#:  [id number] / [id number]

DD:  04/01/2021 22:38  DT:  04/01/2021 22:56

## 2021-04-02 NOTE — PN
DATE:  04/02/2021



SUBJECTIVE:  The patient was seen today, met with the staff, chart reviewed and

also covering for Dr. Frazier.  Staff reports that she refused medications and

also angry with the staff.  The patient is being evaluated for movement

disorder.  The patient apparently had a CT scan positive for hydrocephalus.  The

patient to be followed up by Dr. Benjamin, the neurologist.



OBSERVATION:

VITAL SIGNS:  Temperature 97.7, pulse 96, respirations 20, O2 sat 96%.  Slept

about 7 hours last night.  The patient's appetite is fair.



MEDICATIONS:  The patient's current medications include Seroquel 50 mg twice a

day, BuSpar 5 mg 3 times a day, trazodone 50 mg at night, and olanzapine 2.5 mg

q. 2 hours p.r.n. and also Klonopin 0.5 mg twice a day.  She is not having any

side effects to the medications.



ASSESSMENT:

1.  Bipolar disorder.

2.  Dementia, mild. unspecified.

3.  Generalized anxiety disorder.

4.  History of alcohol dependence.



PLAN:  To continue with the treatment.



LENGTH OF STAY:  7 days.





______________________________

DARLINE SHERMAN MD



DR:  ERNESTO/willow  JOB#:  [id number] / [id number]

DD:  04/02/2021 17:26  DT:  04/02/2021 20:59
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
## 2021-04-03 VITALS — SYSTOLIC BLOOD PRESSURE: 90 MMHG | DIASTOLIC BLOOD PRESSURE: 78 MMHG

## 2021-04-03 VITALS — SYSTOLIC BLOOD PRESSURE: 102 MMHG | DIASTOLIC BLOOD PRESSURE: 78 MMHG

## 2021-04-03 VITALS — DIASTOLIC BLOOD PRESSURE: 51 MMHG | SYSTOLIC BLOOD PRESSURE: 92 MMHG

## 2021-04-03 LAB
ALBUMIN SERPL-MCNC: 3.1 G/DL (ref 3.4–5)
ALBUMIN/GLOB SERPL: 0.8 {RATIO} (ref 1–1.7)
ALP SERPL-CCNC: 93 U/L (ref 46–116)
ALT SERPL-CCNC: 26 U/L (ref 14–59)
ANION GAP SERPL CALC-SCNC: 9 MMOL/L (ref 6–14)
AST SERPL-CCNC: 19 U/L (ref 15–37)
BASOPHILS # BLD AUTO: 0 X10^3/UL (ref 0–0.2)
BASOPHILS NFR BLD: 1 % (ref 0–3)
BILIRUB SERPL-MCNC: 0.3 MG/DL (ref 0.2–1)
BUN/CREAT SERPL: 18 (ref 6–20)
CA-I SERPL ISE-MCNC: 22 MG/DL (ref 7–20)
CALCIUM SERPL-MCNC: 9 MG/DL (ref 8.5–10.1)
CHLORIDE SERPL-SCNC: 106 MMOL/L (ref 98–107)
CO2 SERPL-SCNC: 27 MMOL/L (ref 21–32)
CREAT SERPL-MCNC: 1.2 MG/DL (ref 0.6–1)
EOSINOPHIL NFR BLD: 0.3 X10^3/UL (ref 0–0.7)
EOSINOPHIL NFR BLD: 4 % (ref 0–3)
ERYTHROCYTE [DISTWIDTH] IN BLOOD BY AUTOMATED COUNT: 13.6 % (ref 11.5–14.5)
GFR SERPLBLD BASED ON 1.73 SQ M-ARVRAT: 44.4 ML/MIN
GLOBULIN SER-MCNC: 3.8 G/DL (ref 2.2–3.8)
GLUCOSE SERPL-MCNC: 204 MG/DL (ref 70–99)
HCT VFR BLD CALC: 41.2 % (ref 36–47)
HGB BLD-MCNC: 13.5 G/DL (ref 12–15.5)
LYMPHOCYTES # BLD: 1.7 X10^3/UL (ref 1–4.8)
LYMPHOCYTES NFR BLD AUTO: 25 % (ref 24–48)
MCH RBC QN AUTO: 32 PG (ref 25–35)
MCHC RBC AUTO-ENTMCNC: 33 G/DL (ref 31–37)
MCV RBC AUTO: 96 FL (ref 79–100)
MONO #: 0.5 X10^3/UL (ref 0–1.1)
MONOCYTES NFR BLD: 8 % (ref 0–9)
NEUT #: 4.1 X10^3UL (ref 1.8–7.7)
NEUTROPHILS NFR BLD AUTO: 62 % (ref 31–73)
PLATELET # BLD AUTO: 226 X10^3/UL (ref 140–400)
POTASSIUM SERPL-SCNC: 4.1 MMOL/L (ref 3.5–5.1)
PROT SERPL-MCNC: 6.9 G/DL (ref 6.4–8.2)
RBC # BLD AUTO: 4.28 X10^6/UL (ref 3.5–5.4)
SODIUM SERPL-SCNC: 142 MMOL/L (ref 136–145)
WBC # BLD AUTO: 6.6 X10^3/UL (ref 4–11)

## 2021-04-03 RX ADMIN — BUSPIRONE HYDROCHLORIDE SCH MG: 5 TABLET ORAL at 08:20

## 2021-04-03 RX ADMIN — BUSPIRONE HYDROCHLORIDE SCH MG: 5 TABLET ORAL at 20:13

## 2021-04-03 RX ADMIN — OXYCODONE HYDROCHLORIDE AND ACETAMINOPHEN SCH MG: 500 TABLET ORAL at 08:20

## 2021-04-03 RX ADMIN — AMOXICILLIN SCH MG: 250 CAPSULE ORAL at 13:09

## 2021-04-03 RX ADMIN — QUETIAPINE FUMARATE SCH MG: 50 TABLET, FILM COATED ORAL at 20:13

## 2021-04-03 RX ADMIN — AMOXICILLIN SCH MG: 250 CAPSULE ORAL at 20:12

## 2021-04-03 RX ADMIN — AMOXICILLIN SCH MG: 250 CAPSULE ORAL at 08:21

## 2021-04-03 RX ADMIN — MULTIPLE VITAMINS W/ MINERALS TAB SCH TAB: TAB at 08:20

## 2021-04-03 RX ADMIN — TRAZODONE HYDROCHLORIDE PRN MG: 50 TABLET, FILM COATED ORAL at 20:13

## 2021-04-03 RX ADMIN — LEVOTHYROXINE SODIUM SCH MCG: 50 TABLET ORAL at 05:02

## 2021-04-03 RX ADMIN — NICOTINE SCH PATCH: 14 PATCH, EXTENDED RELEASE TOPICAL at 08:20

## 2021-04-03 RX ADMIN — QUETIAPINE FUMARATE SCH MG: 50 TABLET, FILM COATED ORAL at 08:20

## 2021-04-03 RX ADMIN — Medication SCH CAP: at 20:13

## 2021-04-03 RX ADMIN — Medication SCH CAP: at 08:21

## 2021-04-03 RX ADMIN — BUSPIRONE HYDROCHLORIDE SCH MG: 5 TABLET ORAL at 13:10

## 2021-04-03 NOTE — PN
DATE:  04/03/2021



SUBJECTIVE:  The patient was seen today, met with the staff, chart reviewed. 

Also, covering for Dr. Frazier.  Staff reports she fell today, hit her head with

no major injuries.  Currently, she is under observation.  The patient is still

confused, disorganized, resistive to care, compliant with medications, but

refused Synthroid.  She is also uncooperative with vital signs.  The patient

continues to show increased involuntary movements, generalized.



OBSERVATION:

VITAL SIGNS:  The patient refused.

GENERAL:  Slept about 7 hours last night.



LABORATORY DATA:  The patient's lab reviewed.  Also, imaging, the patient had a

CT scan of the head and the impression was limited study due to motion artifact,

hydrocephalus and no intracranial hemorrhage.



MEDICATIONS:  The patient's current medications include clonazepam 0.5 mg twice

a day mostly for involuntary movements and anxiety, Seroquel 50 mg twice a day,

BuSpar 5 mg 3 times a day, trazodone 50 mg at night p.r.n. for sleep and

olanzapine 2.5 mg q.2 hours p.r.n. for psychosis.  The patient is not having any

side effects to medications.



ASSESSMENT:

1.  Bipolar disorder.

2.  Dementia, mild unspecified.

3.  Generalized anxiety disorder.

4.  History of alcohol dependence.



PLAN:  To continue with treatment.



LENGTH OF STAY:  7 days.





______________________________

DARLINE SHERMAN MD



DR:  ERNESTO/willow  JOB#:  [id number] / [id number]

DD:  04/03/2021 17:21  DT:  04/03/2021 20:17

## 2021-04-03 NOTE — NUR
BP low. Orthostatic drop of 30 points from sitting to standing. Dr Sorensen notified. Order to dc 
clonapin. Informed Dr Julian of med change.

## 2021-04-03 NOTE — NUR
Patient fell in dayroom. Patient stated she lost her balance. Patient fell back hitting her 
posterior head. Patient has some swelling and redness localized on her head. Dr. Sorensen notified 
of of fall no new orders. Patient family notified of patient fall.

## 2021-04-03 NOTE — NUR
Patient is in her room on assumption of care, awake in bed. She is disorganized, confused. 
Compliant with physical assessments but refused to answer orientation questions. Took her 
medications whole without issue. No agitation. She does not appear to be experiencing any 
pain or discomfort. Patient appears to be sleeping comfortably at present time. Will 
continue to monitor.

## 2021-04-04 RX ADMIN — QUETIAPINE FUMARATE SCH MG: 50 TABLET, FILM COATED ORAL at 19:42

## 2021-04-04 RX ADMIN — MULTIPLE VITAMINS W/ MINERALS TAB SCH TAB: TAB at 08:38

## 2021-04-04 RX ADMIN — LEVOTHYROXINE SODIUM SCH MCG: 50 TABLET ORAL at 05:05

## 2021-04-04 RX ADMIN — AMOXICILLIN SCH MG: 250 CAPSULE ORAL at 13:47

## 2021-04-04 RX ADMIN — BUSPIRONE HYDROCHLORIDE SCH MG: 5 TABLET ORAL at 13:47

## 2021-04-04 RX ADMIN — OXYCODONE HYDROCHLORIDE AND ACETAMINOPHEN SCH MG: 500 TABLET ORAL at 08:38

## 2021-04-04 RX ADMIN — NICOTINE SCH PATCH: 14 PATCH, EXTENDED RELEASE TOPICAL at 08:38

## 2021-04-04 RX ADMIN — AMOXICILLIN SCH MG: 250 CAPSULE ORAL at 19:42

## 2021-04-04 RX ADMIN — QUETIAPINE FUMARATE SCH MG: 50 TABLET, FILM COATED ORAL at 08:39

## 2021-04-04 RX ADMIN — Medication SCH CAP: at 19:42

## 2021-04-04 RX ADMIN — LEVOTHYROXINE SODIUM SCH MCG: 50 TABLET ORAL at 08:38

## 2021-04-04 RX ADMIN — BUSPIRONE HYDROCHLORIDE SCH MG: 5 TABLET ORAL at 08:38

## 2021-04-04 RX ADMIN — BUSPIRONE HYDROCHLORIDE SCH MG: 5 TABLET ORAL at 19:43

## 2021-04-04 RX ADMIN — Medication SCH CAP: at 08:38

## 2021-04-04 RX ADMIN — TRAZODONE HYDROCHLORIDE PRN MG: 50 TABLET, FILM COATED ORAL at 19:42

## 2021-04-04 RX ADMIN — AMOXICILLIN SCH MG: 250 CAPSULE ORAL at 08:39

## 2021-04-04 NOTE — PN
DATE:  04/04/2021



SUBJECTIVE:  The patient was seen today, met with the staff, chart reviewed and

also covering for Dr. Frazier.  The patient apparently resting and staff reports

no major behavior problems, no falls, but continues to have increased

involuntary movements while she is awake.  The patient also resistive to care.



OBSERVATION:

VITAL SIGNS:  Temperature 96.8, refused blood pressure, pulse 83, respirations

18, O2 sat 96%.  Slept about 7 hours last night.  Her appetite is fair.



MEDICATIONS:  The patient's current medication includes amoxicillin 250 mg

t.i.d., levothyroxine 50 mg daily, Seroquel 50 mg twice a day, BuSpar 5 mg 3

times a day and olanzapine 2.5 mg q. 2 hours p.r.n. and Klonopin was

discontinued.  The patient's appetite is fair.  The patient is not having any

major side effects.



ASSESSMENT:

1.  Bipolar disorder.

2.  Dementia, mild, unspecified.

3.  Generalized anxiety disorder.

4.  History of alcohol dependence.



PLAN:  To continue with the treatment.



LENGTH OF STAY:  7 days.





______________________________

DARLINE SHERMAN MD



DR:  ERNESTO/willow  JOB#:  [id number] / [id number]

DD:  04/04/2021 16:22  DT:  04/04/2021 21:42

## 2021-04-04 NOTE — NUR
Pt up for meals with assist. Resistive to ADL if pt is sleeping. Has been in pleasant 
spirits most of day.

## 2021-04-05 VITALS — SYSTOLIC BLOOD PRESSURE: 108 MMHG | DIASTOLIC BLOOD PRESSURE: 72 MMHG

## 2021-04-05 VITALS — SYSTOLIC BLOOD PRESSURE: 129 MMHG | DIASTOLIC BLOOD PRESSURE: 87 MMHG

## 2021-04-05 RX ADMIN — QUETIAPINE FUMARATE SCH MG: 50 TABLET, FILM COATED ORAL at 08:11

## 2021-04-05 RX ADMIN — TRAZODONE HYDROCHLORIDE PRN MG: 50 TABLET, FILM COATED ORAL at 20:13

## 2021-04-05 RX ADMIN — MULTIPLE VITAMINS W/ MINERALS TAB SCH TAB: TAB at 08:11

## 2021-04-05 RX ADMIN — QUETIAPINE FUMARATE SCH MG: 50 TABLET, FILM COATED ORAL at 20:13

## 2021-04-05 RX ADMIN — AMOXICILLIN SCH MG: 250 CAPSULE ORAL at 20:13

## 2021-04-05 RX ADMIN — Medication SCH CAP: at 20:13

## 2021-04-05 RX ADMIN — OXYCODONE HYDROCHLORIDE AND ACETAMINOPHEN SCH MG: 500 TABLET ORAL at 08:11

## 2021-04-05 RX ADMIN — NICOTINE SCH PATCH: 14 PATCH, EXTENDED RELEASE TOPICAL at 08:11

## 2021-04-05 RX ADMIN — BUSPIRONE HYDROCHLORIDE SCH MG: 5 TABLET ORAL at 20:13

## 2021-04-05 RX ADMIN — LEVOTHYROXINE SODIUM SCH MCG: 50 TABLET ORAL at 08:11

## 2021-04-05 RX ADMIN — AMOXICILLIN SCH MG: 250 CAPSULE ORAL at 13:52

## 2021-04-05 RX ADMIN — AMOXICILLIN SCH MG: 250 CAPSULE ORAL at 08:11

## 2021-04-05 RX ADMIN — BUSPIRONE HYDROCHLORIDE SCH MG: 5 TABLET ORAL at 08:11

## 2021-04-05 RX ADMIN — Medication SCH CAP: at 08:11

## 2021-04-05 RX ADMIN — BUSPIRONE HYDROCHLORIDE SCH MG: 5 TABLET ORAL at 13:52

## 2021-04-05 NOTE — PN
DATE:  04/02/2021



SUBJECTIVE:  The patient denies any new medical or neurological complaints.  She

continued to have involuntarily movements and gait disturbances.  She has not

had any fall recently, however, she has tendency to fall.



OBJECTIVE:

GENERAL:  Well-developed, well-nourished female, not in acute distress.

VITAL SIGNS:  Blood pressure not recorded.  Afebrile, temperature 97.7, oxygen

saturation is 96% on room air, and pulse is 96.

HEENT:  Normocephalic, atraumatic, otherwise unremarkable.

NECK:  Supple.  Negative for carotid bruit, lymphadenopathy or thyromegaly.

LUNGS:  Clear to A and P.

CARDIOVASCULAR:  Regular rate and rhythm, normal S1, S2.

ABDOMEN:  Soft.  Bowel sounds positive.

EXTREMITIES:  Negative for cyanosis, clubbing or edema.

NEUROLOGICAL EXAM:  Mental Status:  The patient is alert and oriented to herself

and situation.  Speech is slow.  No language dysfunction.  Memory, judgment, and

abstract thinking are fair.  The patient denies hallucination or delusion. 

Cranial nerves are intact.  No focal motor or sensory deficit.  Deep tendon

reflexes were symmetric and hypoactive with absent Achilles responses.  Gait: 

The stance is unsteady.  The patient has involuntarily movement involving the

upper and lower extremities.  Gait is unsteady.



DIAGOSTIC STUDIES:  Initial nonenhanced CT scan performed on 04/01/2021 revealed

hydrocephalus, otherwise unremarkable.



IMPRESSION:

1.  Dementia of mild severity.

2.  Bipolar disorder and generalized anxiety disorders.

3.  Involuntarily movements, etiology uncertain, probably due to normal pressure

hydrocephalus.

4.  History of alcohol dependence.



RECOMMENDATIONS:

1.  Continue with physical therapy as needed.

2.  The patient should have neurosurgeon evaluation on an outpatient basis to

evaluate her for possible surgical intervention and shunt placement, otherwise

we will continue with current medical and psychiatric care.





______________________________

M UZIEL CERON MD



DR:  MARLEN/willow  JOB#:  [id number] / [id number]

DD:  04/05/2021 23:39  DT:  04/05/2021 23:49

## 2021-04-05 NOTE — NUR
WEEKLY ACTIVITY THERAPY NOTE

Date of Admission: 3/26/21

Date of AT Assessment: 3/29

Precipitating behaviors that initiated intake and admission: pt belligerent, refusing 
cares/showers, becoming combative and swinging at staff, agitated, anxious, verbally 
abusive, and name calling.

Goal aimed:increase socialization and engagement

Initial Goal:Pt will participate in at least one individual or group Activity Therapy 
session before discharge.

Weekly progress towards goal: exceeded, 4/1

Group participation level: 2 min, 1 mod, 1 full

Weekly highlights: dancing and encouraging peers on Tuesday morning, birdhouses Tuesday afternoon, Easter basket Friday afternoon

Behaviors observed: pleasant with peers, combative with another pt Friday afternoon

Plan: change goal to: Pt will participate in at least three individual or group Activity 
Therapy sessions per week. 

Beneficial adaptations:

## 2021-04-05 NOTE — TX PLAN
Interdisciplinary Tx Plan


Admission Information


Mar 26, 2021 at 21:37


Legal Status (on Admission):  Voluntary


DPOA/Guardian Name:  Marylou Smiht


Contact Phone Number:  [phone]


Other Contact Name:  Alva


Other Contact Phone:  [phone]


Verified Code Status:  DNR


Allergies:  


Coded Allergies:  


     codeine (Verified  Allergy, Unknown, Unknown, 3/26/21)


     propoxyphene (Verified  Allergy, Unknown, Unknown, 3/26/21)


     tetracycline (Verified  Allergy, Unknown, Unknown, 3/26/21)





Diagnoses


Primary Diagnosis:  


1.  Bipolar disorder, mixed.


2.  Dementia, mild unspecified.


3.  Generalized anxiety disorder.


Reasons for Admission:  Aggressive, Agitated, Angry, Anxiety/Panic, Combative


Problem in Patient's Words:  


The behaviors that pt is currently demonstrating  


are the behaviors she was having a few years ago  


when she was placed in her facility. At that  


time, she probably hadn't bathed in nine monthes  


as she was living in an apaprtment and not caring  


for herself.


Additional Admission Comments:  


Per intake pt, was belligerent, refusing  


cares/showers, incontinent, combative, swinging  


at staff, agitated, anxious, verbally abusive,  


name calling, and staff from her facility would  


distance themseleves from her because of her  


behaviors.





Problems


Active Problems:  


Agitation, angry, withdrawn, uncooperative


Inactive Problems:  


None noted at this time.





Pt Strengths/Limitations


Ability for [place]:  Poor


Cognitive Functioning/Ability:  Poor


Communication Skills/Ability:  Poor


Financial Resources:  Poor


Insight/Judgement:  Poor


Intellectual Ability:  Poor


Physical Health:  Fair


Social Skills:  Poor


Stability in Family:  Fair


Stability in School/Work:  Poor


Verbal Skills:  Fair





Discharge Criteria


Discharge Criteria:  No need for close observ., Adequate arrangements @DC, V

erbal commit med comply, Improved mood/thought


Other Discharge Comments:  


None at this time.





Preliminary Discharge Plan


Preliminary DC Plan:  Current Living Arrange.





Special Precautions


Special Precautions:  Agitation/Assault


Fall Risk:  Moderate





Initial D/C Plan





Pt plan is to return to [facility].


Identified Discharge Needs:  


None noted at this time.





Currently Utilized Resources


Currently Utilized Resources/P:  


PCP is Dr. White


Psychiatrist is Dr. Pino


DPOA is kendal Marylou Sarah


Facility is [facility] Community Resources:  


None noted at this time.





Identified Problems/Hx/Goals


Objectives/Short-Term Goals


Short Term Goals:  Control abnormal behavior, Dec. Aggression, Dec. 

Anxiety/Panic, Dec. Outbursts, Dec. Symp. Depression, Medication Stabilization, 

Monitor Med Effects, Promote Coping Skill


Short Term Goals in Patient's:  


Medication eval and adjusments to help with controlling abnormal


behaviors. Help pt to feel better and be less agitated and more compliant


with cares.





Interventions/Frequency


Staff Interventions/Frequency&:  


Psychiatry to assess pt three times per week for medication management.


Nursing to assess behaviors, monitor medications, and complete 15 minute


checks daily.


Social work to see pt at least two times weekly to aid in return to


placement.


Activities to encourage pt to participate in group activities daily.





History


Vocational History:  


Pt was always a . According to pt  


kendal/Marylou HEREDIA. Pt would not necessarily  


work when she was  as her husbands would  


provide for the home. 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


Education:  


Pt quit school her matilde year as she became  


pregnant at 16 y.o.





Community Follow-up





PCP


Psychiatry


Community Provider/Family Inpu:  


Marylou HEREDIA, aware of pt hospitalization and provided information on


social history. Marylou available as needed for further information.





Treatment Plan Explained


Patient/Representative had this treatment plan explained to him/her as indicated

by the signature below and


has been given the opportunity to ask questions and make suggestions:











Date:  





Patient/Representative Signature: _____________________________________________





Status Update


Update


Pt eating about 75% of his meals and averaging about 7.25 hours of sleep. Pt 

intermittently resistive with cares and somewhat argumentative. Pt, however, has

been compliant the past couple of days. Staff waiting on records from [facility] of CT and MRI. Kendal participated in treatment plan and 

involuntary movements have been noted for a couple of years, as well as, 

shuffling feet when she walks. Pt will return to [facility] once stable.











DAVID GOLDBERG                 Apr 5, 2021 14:21

## 2021-04-05 NOTE — PN
DATE:  04/05/2021



SUBJECTIVE:  The patient was seen today, met with the staff, chart reviewed and

also covering for Dr. Frazier.  Participated in the treatment review conference

today.  Staff reports increased confusion, disorganized thinking, resistive to

care, but compliant with the medications.  She slept about 7 hours last night,

ate 75% of her meals.



MEDICATIONS:  Include amoxicillin 250 mg t.i.d., levothyroxine 50 mg daily,

Seroquel 50 mg twice a day, BuSpar 5 mg 3 times a day and olanzapine 2.5 mg q. 2

hours p.r.n. and Klonopin was discontinued.  The patient's appetite is fair. 

The patient is not having any major side effects.  I also spoke with the family

member as part of her treatment review conference.



ASSESSMENT:

1.  Bipolar disorder.

2.  Dementia, mild unspecified.

3.  Generalized anxiety disorder.

4.  History of alcohol dependence.



PLAN:  To continue with the treatment.



LENGTH OF STAY:  7 days.





______________________________

DARLINE SHERMAN MD



DR:  ERNESTO/willow  JOB#:  [id number] / [id number]

DD:  04/05/2021 16:30  DT:  04/05/2021 20:53

## 2021-04-06 VITALS — SYSTOLIC BLOOD PRESSURE: 130 MMHG | DIASTOLIC BLOOD PRESSURE: 76 MMHG

## 2021-04-06 VITALS — DIASTOLIC BLOOD PRESSURE: 67 MMHG | SYSTOLIC BLOOD PRESSURE: 104 MMHG

## 2021-04-06 RX ADMIN — TRAZODONE HYDROCHLORIDE PRN MG: 50 TABLET, FILM COATED ORAL at 19:43

## 2021-04-06 RX ADMIN — NICOTINE SCH PATCH: 14 PATCH, EXTENDED RELEASE TOPICAL at 09:57

## 2021-04-06 RX ADMIN — BUSPIRONE HYDROCHLORIDE SCH MG: 5 TABLET ORAL at 19:43

## 2021-04-06 RX ADMIN — LEVOTHYROXINE SODIUM SCH MCG: 50 TABLET ORAL at 05:27

## 2021-04-06 RX ADMIN — MULTIPLE VITAMINS W/ MINERALS TAB SCH TAB: TAB at 09:57

## 2021-04-06 RX ADMIN — Medication SCH CAP: at 09:57

## 2021-04-06 RX ADMIN — QUETIAPINE FUMARATE SCH MG: 50 TABLET, FILM COATED ORAL at 09:57

## 2021-04-06 RX ADMIN — BUSPIRONE HYDROCHLORIDE SCH MG: 5 TABLET ORAL at 09:57

## 2021-04-06 RX ADMIN — Medication SCH CAP: at 19:43

## 2021-04-06 RX ADMIN — OXYCODONE HYDROCHLORIDE AND ACETAMINOPHEN SCH MG: 500 TABLET ORAL at 09:57

## 2021-04-06 RX ADMIN — BUSPIRONE HYDROCHLORIDE SCH MG: 5 TABLET ORAL at 13:13

## 2021-04-06 RX ADMIN — QUETIAPINE FUMARATE SCH MG: 50 TABLET, FILM COATED ORAL at 19:43

## 2021-04-06 NOTE — NUR
Pt withdrawn to room, lying in bed when approached. Pt irritable, disorganized, and 
resistive. Pt combative when approached for assessment and medications, striking out at 
staff. Medications administered via oral syringe which pt consumed.

## 2021-04-06 NOTE — NUR
PATIENT LOCATED IN A DAY ROOM PARTICIPATING IN A GROUP UPON ASSESSMENT. PATIENT IS COMPLIANT 
WITH MEDICATION, NEED SOME ENCOURAGEMENT TO GET PILLS SWALLOWED, PATIENT REPEATEDLY PULLED 
HER MEDICATIONS OUT FROM HER MOUTH WITH FINGERS, PT WAS INSTRUCTED TO PUT THEM BACK AND 
SWALLOW WITH WATER. PATIENT FOLLOWED DIRECTIONS. PATIENT IS CURRENTLY IN HER ROOM TAKING A 
NAP.

## 2021-04-06 NOTE — NUR
Pt withdrawn to room, lying in bed awake when approached. Pt calm, confused, and 
disorganized but interactive during our encounter. Pt cooperative with assessment and 
compliant with medications administered whole.

## 2021-04-07 VITALS — DIASTOLIC BLOOD PRESSURE: 76 MMHG | SYSTOLIC BLOOD PRESSURE: 119 MMHG

## 2021-04-07 VITALS — SYSTOLIC BLOOD PRESSURE: 138 MMHG | DIASTOLIC BLOOD PRESSURE: 102 MMHG

## 2021-04-07 RX ADMIN — TRAZODONE HYDROCHLORIDE PRN MG: 50 TABLET, FILM COATED ORAL at 20:33

## 2021-04-07 RX ADMIN — NICOTINE SCH PATCH: 14 PATCH, EXTENDED RELEASE TOPICAL at 08:46

## 2021-04-07 RX ADMIN — MULTIPLE VITAMINS W/ MINERALS TAB SCH TAB: TAB at 08:45

## 2021-04-07 RX ADMIN — QUETIAPINE FUMARATE SCH MG: 50 TABLET, FILM COATED ORAL at 20:33

## 2021-04-07 RX ADMIN — OXYCODONE HYDROCHLORIDE AND ACETAMINOPHEN SCH MG: 500 TABLET ORAL at 08:45

## 2021-04-07 RX ADMIN — BUSPIRONE HYDROCHLORIDE SCH MG: 5 TABLET ORAL at 08:45

## 2021-04-07 RX ADMIN — BUSPIRONE HYDROCHLORIDE SCH MG: 5 TABLET ORAL at 14:14

## 2021-04-07 RX ADMIN — Medication SCH CAP: at 20:33

## 2021-04-07 RX ADMIN — Medication SCH CAP: at 08:45

## 2021-04-07 RX ADMIN — BUSPIRONE HYDROCHLORIDE SCH MG: 5 TABLET ORAL at 20:33

## 2021-04-07 RX ADMIN — QUETIAPINE FUMARATE SCH MG: 50 TABLET, FILM COATED ORAL at 08:45

## 2021-04-07 RX ADMIN — LEVOTHYROXINE SODIUM SCH MCG: 50 TABLET ORAL at 05:36

## 2021-04-07 NOTE — NUR
Pt sitting in day room, watching television when approached. Pt disorganized but calm and 
interactive this evening. Pt cooperative with assessment and compliant with medications 
administered whole. No agitation or aggression noted thus far this shift.

## 2021-04-07 NOTE — PN
DATE:  04/07/2021



SEEN BY TELEHEALTH



SUBJECTIVE:  The patient's behavior remains the same.  Continues to be

hyperactive, restless with markedly involuntary movements.  The patient is also

exhibiting increased confusion.



OBJECTIVE:

VITAL SIGNS:  Temperature 97.7, blood pressure is 80/48, pulse 78, respirations

18, O2 sat 97%.

GENERAL:  Slept about 7 hours last night.  The patient's appetite is fair.



CURRENT MEDICATIONS:  She is on Seroquel 50 mg twice a day, BuSpar 5 mg 3 times

a day and olanzapine 2.5 mg q. 2 hours p.r.n.



LABORATORY DATA:  Reviewed.



ASSESSMENT:

1.  Bipolar disorder.

2.  Dementia, mild unspecified.

3.  Generalized anxiety disorder.

4.  History of alcohol dependence.



PLAN:  To continue with the treatment.



LENGTH OF STAY:  7 days.





______________________________

DARLINE SHERMAN MD



DR:  ERNESTO/willow  JOB#:  [id number] / [id number]

DD:  04/07/2021 22:45  DT:  04/07/2021 22:49

## 2021-04-07 NOTE — NUR
Patient was extremely agitated after breakfast and would not take morning medication, she 
knocked the water cup out of my hand at that time.  RE-approached patient after about an 
hour and she was calm and compliant with medications.  She has been disorganized, wandering, 
and calm for the remainder of this shift. Will continue to monitor and report to oncoming 
shift.

## 2021-04-07 NOTE — PN
DATE:  04/06/2021



This is late entry for the service date 04/06/2021.



SUBJECTIVE:  Staff reports some improvement, increased confusion, but compliant

with medication.



OBJECTIVE:

VITAL SIGNS:  Blood pressure 130/76, pulse 83, respirations 16, O2 sat 96%.

GENERAL:  Slept about 8 hours last night.  The patient slept fairly well for the

past few days.



MEDICATIONS:  The patient's current medications include Seroquel 50 mg twice a

day, BuSpar 5 mg 3 times a day and olanzapine 2.5 mg q. 2 hours p.r.n.



The patient is not having any side effects from the medications.



LABORATORY DATA:  Reviewed.



ASSESSMENT:

1.  Bipolar disorder.

2.  Dementia, mild unspecified.

3.  Generalized anxiety disorder.

4.  History of alcohol dependence.



PLAN:  To continue with the treatment.



LENGTH OF STAY:  7 days.





______________________________

DARLINE SHERMAN MD



DR:  ERNESTO/willow  JOB#:  [id number] / [id number]

DD:  04/07/2021 21:27  DT:  04/07/2021 21:42

## 2021-04-08 VITALS — DIASTOLIC BLOOD PRESSURE: 54 MMHG | SYSTOLIC BLOOD PRESSURE: 109 MMHG

## 2021-04-08 VITALS — SYSTOLIC BLOOD PRESSURE: 173 MMHG | DIASTOLIC BLOOD PRESSURE: 89 MMHG

## 2021-04-08 RX ADMIN — LEVOTHYROXINE SODIUM SCH MCG: 50 TABLET ORAL at 05:41

## 2021-04-08 RX ADMIN — BUSPIRONE HYDROCHLORIDE SCH MG: 5 TABLET ORAL at 20:31

## 2021-04-08 RX ADMIN — BUSPIRONE HYDROCHLORIDE SCH MG: 5 TABLET ORAL at 14:04

## 2021-04-08 RX ADMIN — QUETIAPINE FUMARATE SCH MG: 50 TABLET, FILM COATED ORAL at 08:38

## 2021-04-08 RX ADMIN — BUSPIRONE HYDROCHLORIDE SCH MG: 5 TABLET ORAL at 08:39

## 2021-04-08 RX ADMIN — NICOTINE SCH PATCH: 14 PATCH, EXTENDED RELEASE TOPICAL at 08:39

## 2021-04-08 RX ADMIN — Medication SCH CAP: at 08:39

## 2021-04-08 RX ADMIN — QUETIAPINE FUMARATE SCH MG: 50 TABLET, FILM COATED ORAL at 20:30

## 2021-04-08 RX ADMIN — MULTIPLE VITAMINS W/ MINERALS TAB SCH TAB: TAB at 08:39

## 2021-04-08 RX ADMIN — OXYCODONE HYDROCHLORIDE AND ACETAMINOPHEN SCH MG: 500 TABLET ORAL at 08:39

## 2021-04-08 RX ADMIN — Medication SCH CAP: at 20:31

## 2021-04-08 NOTE — NUR
Patient has been calm, disorganized, and pleasantly confused during this shift.  She spent 
the majority of the morning withdrawn to her room; she did spend a large part of the 
afternoon in the day room participating in groups.  Will continue to monitor and report to 
oncoming shift.

## 2021-04-08 NOTE — NUR
Patient is in h day room on assumption of care, watching television. She is disorganized, 
confused. Compliant with physical assessments but refused to answer orientation questions. 
Took her medications whole without issue. No agitation. She does not appear to be 
experiencing any pain or discomfort. Patient appears to be sleeping comfortably at present 
time. Will continue to monitor.

## 2021-04-09 VITALS — SYSTOLIC BLOOD PRESSURE: 173 MMHG | DIASTOLIC BLOOD PRESSURE: 88 MMHG

## 2021-04-09 VITALS — SYSTOLIC BLOOD PRESSURE: 104 MMHG | DIASTOLIC BLOOD PRESSURE: 58 MMHG

## 2021-04-09 RX ADMIN — MULTIPLE VITAMINS W/ MINERALS TAB SCH TAB: TAB at 08:12

## 2021-04-09 RX ADMIN — BUSPIRONE HYDROCHLORIDE SCH MG: 5 TABLET ORAL at 20:28

## 2021-04-09 RX ADMIN — Medication SCH CAP: at 20:28

## 2021-04-09 RX ADMIN — BUSPIRONE HYDROCHLORIDE SCH MG: 5 TABLET ORAL at 08:12

## 2021-04-09 RX ADMIN — LEVOTHYROXINE SODIUM SCH MCG: 50 TABLET ORAL at 08:12

## 2021-04-09 RX ADMIN — NICOTINE SCH PATCH: 7 PATCH TRANSDERMAL at 08:13

## 2021-04-09 RX ADMIN — QUETIAPINE FUMARATE SCH MG: 50 TABLET, FILM COATED ORAL at 08:12

## 2021-04-09 RX ADMIN — Medication SCH CAP: at 08:12

## 2021-04-09 RX ADMIN — QUETIAPINE FUMARATE SCH MG: 50 TABLET, FILM COATED ORAL at 20:29

## 2021-04-09 RX ADMIN — OXYCODONE HYDROCHLORIDE AND ACETAMINOPHEN SCH MG: 500 TABLET ORAL at 08:12

## 2021-04-09 RX ADMIN — BUSPIRONE HYDROCHLORIDE SCH MG: 5 TABLET ORAL at 14:03

## 2021-04-09 NOTE — PN
DATE:  04/08/2021



SUBJECTIVE:  The patient was seen today, met with the staff, chart reviewed. 

Staff reports some improvement.  The patient is still hyperactive, restless,

involuntary movements.  The patient also has high level of anxiety.



OBSERVATION:

VITAL SIGNS:  Temperature 97.6, blood pressure 173/89, pulse 69, respirations

22, O2 sat 100%.

GENERAL:  Slept about 6 hours last night.  The patient's appetite is fair.



MEDICATIONS:  The patient's current medications include Seroquel 50 mg twice a

day, BuSpar 5 mg 3 times a day and olanzapine 2.5 mg q. 2 hours p.r.n.



LABORATORY DATA:  The patient's lab reviewed.



ASSESSMENT:

1.  Bipolar disorder.

2.  Dementia, unspecified.

3.  Generalized anxiety disorder.

4.  History of alcohol dependence.



PLAN:  To continue with the treatment.



LENGTH OF STAY:  7 days.





______________________________

DARLINE SHERMAN MD



DR:  ERNESTO/willow  JOB#:  [id number] / [id number]

DD:  04/09/2021 18:00  DT:  04/09/2021 22:42

## 2021-04-09 NOTE — PN
DATE:  04/09/2021



SUBJECTIVE:  The patient was seen today, met with the staff, chart reviewed. 

Staff reports increased confusion, disorganized thinking, increased psychomotor

activity and also gets agitated, involuntary movements.  The patient is

compliant with the medications.



OBSERVATION:

VITAL SIGNS:  Temperature 97.4, blood pressure 164/58, pulse 77, respirations

18, O2 sat 95%.

GENERAL:  Slept about 6 hours last night.  The patient's appetite is fair.



CURRENT MEDICATIONS:  Include Seroquel 50 mg twice a day, BuSpar 5 mg 3 times a

day and olanzapine 2.5 mg q. 2 hours p.r.n.



ASSESSMENT:

1.  Bipolar disorder.

2.  Dementia, mild unspecified.

3.  Generalized anxiety disorder.

4.  History of alcohol dependence.



PLAN:  To continue with the treatment.



LENGTH OF STAY:  7 days.





______________________________

DARLINE SHERMAN MD



DR:  ERNESTO/willow  JOB#:  [id number] / [id number]

DD:  04/09/2021 17:34  DT:  04/09/2021 22:32

## 2021-04-09 NOTE — NUR
Patient is in the day room on assumption of care, watching television. She is disorganized, 
confused. Compliant with physical assessments but refused to answer orientation questions. 
Took her medications whole without issue. She was angry and resistive with HS cares and 
shower, but did eventually cooperate. She does not appear to be experiencing any pain or 
discomfort. Patient appears to be sleeping comfortably at present time. Will continue to 
monitor.

## 2021-04-09 NOTE — NUR
Updated notes faxed to pt facility, [facility]. Transmittal successful. Follow up call attempted 
to Caron at [facility]. [facility] for return call. SW then contacted pt DPOA, Marylou, and discussed 
recommendation for consult with neurosurgeon following [facility] discharge. SW reported to 
Marylou that pt will continue to remain in hospital for now to monitor psychotropic meds 
and further adjustments as needed. SW will follow up with facility and DPOA again next week.

## 2021-04-10 VITALS — DIASTOLIC BLOOD PRESSURE: 70 MMHG | SYSTOLIC BLOOD PRESSURE: 120 MMHG

## 2021-04-10 VITALS — DIASTOLIC BLOOD PRESSURE: 54 MMHG | SYSTOLIC BLOOD PRESSURE: 102 MMHG

## 2021-04-10 RX ADMIN — QUETIAPINE FUMARATE SCH MG: 50 TABLET, FILM COATED ORAL at 08:26

## 2021-04-10 RX ADMIN — BUSPIRONE HYDROCHLORIDE SCH MG: 5 TABLET ORAL at 21:01

## 2021-04-10 RX ADMIN — TRAZODONE HYDROCHLORIDE PRN MG: 50 TABLET, FILM COATED ORAL at 21:01

## 2021-04-10 RX ADMIN — QUETIAPINE FUMARATE SCH MG: 50 TABLET, FILM COATED ORAL at 21:01

## 2021-04-10 RX ADMIN — NICOTINE SCH PATCH: 7 PATCH TRANSDERMAL at 08:26

## 2021-04-10 RX ADMIN — OXYCODONE HYDROCHLORIDE AND ACETAMINOPHEN SCH MG: 500 TABLET ORAL at 08:26

## 2021-04-10 RX ADMIN — MULTIPLE VITAMINS W/ MINERALS TAB SCH TAB: TAB at 08:26

## 2021-04-10 RX ADMIN — BUSPIRONE HYDROCHLORIDE SCH MG: 5 TABLET ORAL at 08:26

## 2021-04-10 RX ADMIN — Medication SCH CAP: at 08:26

## 2021-04-10 RX ADMIN — BUSPIRONE HYDROCHLORIDE SCH MG: 5 TABLET ORAL at 13:55

## 2021-04-10 RX ADMIN — LEVOTHYROXINE SODIUM SCH MCG: 50 TABLET ORAL at 08:25

## 2021-04-10 RX ADMIN — Medication SCH CAP: at 21:00

## 2021-04-10 RX ADMIN — POLYETHYLENE GLYCOL 3350 SCH GM: 17 POWDER, FOR SOLUTION ORAL at 08:26

## 2021-04-10 NOTE — NUR
Pt has been calm, cooperative, and pleasant during shift today. She was incontinent of bowel 
when shift first started and staff gave her a shower. Pt was very agitated during the shower 
but has not displayed any other behaviors this shift. Pt took meds whole this shift. Pt came 
down for all meals and ate well. She sat in the day room in between meals and was pleasant 
with other pts.

## 2021-04-11 VITALS — SYSTOLIC BLOOD PRESSURE: 94 MMHG | DIASTOLIC BLOOD PRESSURE: 64 MMHG

## 2021-04-11 VITALS — DIASTOLIC BLOOD PRESSURE: 63 MMHG | SYSTOLIC BLOOD PRESSURE: 105 MMHG

## 2021-04-11 RX ADMIN — OXYCODONE HYDROCHLORIDE AND ACETAMINOPHEN SCH MG: 500 TABLET ORAL at 07:47

## 2021-04-11 RX ADMIN — LEVOTHYROXINE SODIUM SCH MCG: 50 TABLET ORAL at 05:33

## 2021-04-11 RX ADMIN — TRAZODONE HYDROCHLORIDE PRN MG: 50 TABLET, FILM COATED ORAL at 20:09

## 2021-04-11 RX ADMIN — QUETIAPINE FUMARATE SCH MG: 50 TABLET, FILM COATED ORAL at 20:09

## 2021-04-11 RX ADMIN — BUSPIRONE HYDROCHLORIDE SCH MG: 5 TABLET ORAL at 13:26

## 2021-04-11 RX ADMIN — Medication SCH CAP: at 07:46

## 2021-04-11 RX ADMIN — BUSPIRONE HYDROCHLORIDE SCH MG: 5 TABLET ORAL at 07:46

## 2021-04-11 RX ADMIN — BUSPIRONE HYDROCHLORIDE SCH MG: 5 TABLET ORAL at 20:10

## 2021-04-11 RX ADMIN — MULTIPLE VITAMINS W/ MINERALS TAB SCH TAB: TAB at 07:47

## 2021-04-11 RX ADMIN — POLYETHYLENE GLYCOL 3350 SCH GM: 17 POWDER, FOR SOLUTION ORAL at 07:47

## 2021-04-11 RX ADMIN — QUETIAPINE FUMARATE SCH MG: 50 TABLET, FILM COATED ORAL at 07:47

## 2021-04-11 RX ADMIN — NICOTINE SCH PATCH: 7 PATCH TRANSDERMAL at 07:46

## 2021-04-11 RX ADMIN — Medication SCH CAP: at 20:09

## 2021-04-11 NOTE — NUR
Pt was located in the day room sitting in chair watching TV with other residents/staff 
members. Pt was pleasant and calm during for her assessment and medication pass. The pt was 
compliant with taking her medications whole and ate her HS snack independently. Pt was alert 
to self only. Pt denied pain or discomfort.

## 2021-04-11 NOTE — PDOC
Exam


Note:


Hayden Note:


Please also refer to the separate dictated note~for this date of service 

dictated separately.~Patient seen individually. Discussed the patient with 

Nursing staff reviewed the chart.~Reviewed interim history and current 

functioning. Reviewed vital signs,~Labs/ Radiology~and current medications noted

below. Continue current treatment with the changes noted in the dictated 

addendum note





Assessment:


Vital Signs/I&O:





                                   Vital Signs








  Date Time  Temp Pulse Resp B/P (MAP) Pulse Ox O2 Delivery O2 Flow Rate FiO2


 


4/11/21 15:52 98.3 88 20 94/64 (74) 97   


 


4/11/21 06:37      Room Air  














                                    I & O   


 


 4/10/21 4/10/21 4/11/21





 15:00 23:00 07:00


 


Intake Total 1500 ml 360 ml 


 


Balance 1500 ml 360 ml 











Current Medications:


Meds:





Current Medications








 Medications


  (Trade)  Dose


 Ordered  Sig/Elizabeth


 Route


 PRN Reason  Start Time


 Stop Time Status Last Admin


Dose Admin


 


 Olanzapine


  (ZyPREXA ZYDIS)  2.5 mg  PRN Q2HR  PRN


 PO


 PSYCHOSIS  3/26/21 22:15


    4/1/21 06:54





 


 Trazodone HCl


  (Desyrel)  50 mg  PRN QHS  PRN


 PO


 INSOMNIA  3/26/21 22:15


    4/11/21 20:09





 


 Buspirone HCl


  (Buspar)  5 mg  TID


 PO


   3/27/21 09:00


    4/11/21 20:10





 


 Quetiapine


 Fumarate


  (SEROquel)  50 mg  BID


 PO


   3/27/21 09:00


    4/11/21 20:09





 


 Ascorbic Acid


  (Vitamin C)  500 mg  DAILY


 PO


   3/27/21 09:00


    4/11/21 07:47





 


 Levothyroxine


 Sodium


  (Synthroid)  88 mcg  DAILY06


 PO


   3/27/21 06:00


 3/28/21 12:05 DC 3/27/21 04:40





 


 Multivitamins/


 Calcium


  (Thera-M Plus)  1 tab  DAILY


 PO


   3/27/21 09:00


    4/11/21 07:47





 


 Acetaminophen


  (Tylenol)  650 mg  PRN Q6HRS  PRN


 PO


 MILD PAIN / TEMP > 100.3'F  3/26/21 23:45


     





 


 Multi-Ingredient


 Ointment


  (Analgesic Balm)  1 chon  PRN QID  PRN


 TP


 MUSCLE PAIN  3/26/21 23:45


     





 


 Al Hydroxide/Mg


 Hydroxide


  (Mylanta Plus Xs)  15 ml  PRN AFTMEALHC  PRN


 PO


 DYSPEPSIA  3/26/21 23:45


     





 


 Magnesium


 Hydroxide


  (Milk Of


 Magnesia)  2,400 mg  PRN QHS  PRN


 PO


 CONSTIPATION  3/26/21 23:45


     





 


 Nicotine


  (Nicoderm Cq


 14mg Patch)  1 patch  DAILY


 TD


   3/27/21 09:00


 4/8/21 22:52 DC 4/8/21 08:39





 


 Levothyroxine


 Sodium


  (Synthroid)  50 mcg  DAILY06


 PO


   3/29/21 06:00


    4/11/21 05:33





 


 Amoxicillin


  (Amoxil)  250 mg  [id number]


 PO


   3/29/21 21:00


 4/5/21 21:00 DC 4/5/21 20:13





 


 Clonazepam


  (KlonoPIN)  0.5 mg  BID


 PO


   3/31/21 21:00


 4/3/21 17:33 DC 4/3/21 08:23





 


 Diazepam


  (Valium)  5 mg  1X  ONCE


 PO


   4/1/21 06:00


 4/1/21 06:01 DC 4/1/21 06:00





 


 Lactobacillus


 Rhamnosus


  (Culturelle)  1 cap  BID


 PO


   4/1/21 09:00


    4/11/21 20:09





 


 Nicotine


  (Nicoderm Cq 7mg


 Patch)  1 patch  DAILY


 TD


   4/9/21 09:00


 4/23/21 11:00  4/11/21 07:46





 


 Polyethylene


 Glycol


  (miraLAX)  17 gm  DAILY


 PO


   4/10/21 09:00


     











I have reviewed the current psychotropics carefully including drug interactions.

 Risk benefit ratio favors no change other than as noted in my dictated progress

note.





Diagnosis:


Problems:  


(1) Bipolar affective disorder, mixed


(2) Anxiety disorder, unspecified











WILTON JESUS MD                 Apr 11, 2021 22:30

## 2021-04-11 NOTE — PDOC
Exam


Note:


Hayden Note:


Late entry for 04/10/21. Please also refer to the separate dictated note~for 

this date of service dictated separately.~Patient seen individually. Discussed 

the patient with Nursing staff reviewed the chart.~Reviewed interim history and 

current functioning. Reviewed vital signs,~Labs/ Radiology~and current medicat

ions noted below. Continue current treatment with the changes noted in the 

dictated addendum note





Assessment:


Vital Signs/I&O:





                                   Vital Signs








  Date Time  Temp Pulse Resp B/P (MAP) Pulse Ox O2 Delivery O2 Flow Rate FiO2


 


4/11/21 15:52 98.3 88 20 94/64 (74) 97   


 


4/11/21 06:37      Room Air  














                                    I & O   


 


 4/10/21 4/10/21 4/11/21





 15:00 23:00 07:00


 


Intake Total 1500 ml 360 ml 


 


Balance 1500 ml 360 ml 











Current Medications:


Meds:





Current Medications








 Medications


  (Trade)  Dose


 Ordered  Sig/Elizabeth


 Route


 PRN Reason  Start Time


 Stop Time Status Last Admin


Dose Admin


 


 Olanzapine


  (ZyPREXA ZYDIS)  2.5 mg  PRN Q2HR  PRN


 PO


 PSYCHOSIS  3/26/21 22:15


    4/1/21 06:54





 


 Trazodone HCl


  (Desyrel)  50 mg  PRN QHS  PRN


 PO


 INSOMNIA  3/26/21 22:15


    4/11/21 20:09





 


 Buspirone HCl


  (Buspar)  5 mg  TID


 PO


   3/27/21 09:00


    4/11/21 20:10





 


 Quetiapine


 Fumarate


  (SEROquel)  50 mg  BID


 PO


   3/27/21 09:00


    4/11/21 20:09





 


 Ascorbic Acid


  (Vitamin C)  500 mg  DAILY


 PO


   3/27/21 09:00


    4/11/21 07:47





 


 Levothyroxine


 Sodium


  (Synthroid)  88 mcg  DAILY06


 PO


   3/27/21 06:00


 3/28/21 12:05 DC 3/27/21 04:40





 


 Multivitamins/


 Calcium


  (Thera-M Plus)  1 tab  DAILY


 PO


   3/27/21 09:00


    4/11/21 07:47





 


 Acetaminophen


  (Tylenol)  650 mg  PRN Q6HRS  PRN


 PO


 MILD PAIN / TEMP > 100.3'F  3/26/21 23:45


     





 


 Multi-Ingredient


 Ointment


  (Analgesic Balm)  1 chon  PRN QID  PRN


 TP


 MUSCLE PAIN  3/26/21 23:45


     





 


 Al Hydroxide/Mg


 Hydroxide


  (Mylanta Plus Xs)  15 ml  PRN AFTMEALHC  PRN


 PO


 DYSPEPSIA  3/26/21 23:45


     





 


 Magnesium


 Hydroxide


  (Milk Of


 Magnesia)  2,400 mg  PRN QHS  PRN


 PO


 CONSTIPATION  3/26/21 23:45


     





 


 Nicotine


  (Nicoderm Cq


 14mg Patch)  1 patch  DAILY


 TD


   3/27/21 09:00


 4/8/21 22:52 DC 4/8/21 08:39





 


 Levothyroxine


 Sodium


  (Synthroid)  50 mcg  DAILY06


 PO


   3/29/21 06:00


    4/11/21 05:33





 


 Amoxicillin


  (Amoxil)  250 mg  [id number]


 PO


   3/29/21 21:00


 4/5/21 21:00 DC 4/5/21 20:13





 


 Clonazepam


  (KlonoPIN)  0.5 mg  BID


 PO


   3/31/21 21:00


 4/3/21 17:33 DC 4/3/21 08:23





 


 Diazepam


  (Valium)  5 mg  1X  ONCE


 PO


   4/1/21 06:00


 4/1/21 06:01 DC 4/1/21 06:00





 


 Lactobacillus


 Rhamnosus


  (Culturelle)  1 cap  BID


 PO


   4/1/21 09:00


    4/11/21 20:09





 


 Nicotine


  (Nicoderm Cq 7mg


 Patch)  1 patch  DAILY


 TD


   4/9/21 09:00


 4/23/21 11:00  4/11/21 07:46





 


 Polyethylene


 Glycol


  (miraLAX)  17 gm  DAILY


 PO


   4/10/21 09:00


     











I have reviewed the current psychotropics carefully including drug interactions.

 Risk benefit ratio favors no change other than as noted in my dictated progress

note.





Diagnosis:


Problems:  


(1) Bipolar affective disorder, mixed


(2) Anxiety disorder, unspecified











WILTON JESUS MD                 Apr 11, 2021 22:29

## 2021-04-11 NOTE — NUR
Pt calm, cooperative, and pleasant this shift. Lia took her meds whole this am; Miralax 
was held today due to three bowel movements yesterday 4/10. Lia ate both breakfast and 
lunch in the dining room and ate well. She sat outside and listened/sang along to music 
after breakfast and has been sitting in the day room since lunch.

## 2021-04-11 NOTE — NUR
Pt pleasant and compliant with meds, continues to ambulate independently. Pills taken whole 
with minimal cueing.

## 2021-04-12 VITALS — DIASTOLIC BLOOD PRESSURE: 79 MMHG | SYSTOLIC BLOOD PRESSURE: 125 MMHG

## 2021-04-12 VITALS — DIASTOLIC BLOOD PRESSURE: 52 MMHG | SYSTOLIC BLOOD PRESSURE: 84 MMHG

## 2021-04-12 RX ADMIN — OXYCODONE HYDROCHLORIDE AND ACETAMINOPHEN SCH MG: 500 TABLET ORAL at 12:27

## 2021-04-12 RX ADMIN — NICOTINE SCH PATCH: 7 PATCH TRANSDERMAL at 12:28

## 2021-04-12 RX ADMIN — MULTIPLE VITAMINS W/ MINERALS TAB SCH TAB: TAB at 12:27

## 2021-04-12 RX ADMIN — BUSPIRONE HYDROCHLORIDE SCH MG: 5 TABLET ORAL at 20:03

## 2021-04-12 RX ADMIN — Medication SCH CAP: at 20:03

## 2021-04-12 RX ADMIN — Medication SCH CAP: at 12:27

## 2021-04-12 RX ADMIN — QUETIAPINE FUMARATE SCH MG: 50 TABLET, FILM COATED ORAL at 12:27

## 2021-04-12 RX ADMIN — LEVOTHYROXINE SODIUM SCH MCG: 50 TABLET ORAL at 05:57

## 2021-04-12 RX ADMIN — TRAZODONE HYDROCHLORIDE PRN MG: 50 TABLET, FILM COATED ORAL at 20:03

## 2021-04-12 RX ADMIN — POLYETHYLENE GLYCOL 3350 SCH GM: 17 POWDER, FOR SOLUTION ORAL at 09:00

## 2021-04-12 RX ADMIN — QUETIAPINE FUMARATE SCH MG: 50 TABLET, FILM COATED ORAL at 20:04

## 2021-04-12 RX ADMIN — BUSPIRONE HYDROCHLORIDE SCH MG: 5 TABLET ORAL at 14:30

## 2021-04-12 RX ADMIN — BUSPIRONE HYDROCHLORIDE SCH MG: 5 TABLET ORAL at 12:27

## 2021-04-12 NOTE — TX PLAN
Interdisciplinary Tx Plan


Admission Information


Mar 26, 2021 at 21:37


Legal Status (on Admission):  Voluntary


DPOA/Guardian Name:  Marylou Smith


Contact Phone Number:  [phone]


Other Contact Name:  Alva


Other Contact Phone:  [phone]


Verified Code Status:  DNR


Allergies:  


Coded Allergies:  


     codeine (Verified  Allergy, Unknown, Unknown, 3/26/21)


     propoxyphene (Verified  Allergy, Unknown, Unknown, 3/26/21)


     tetracycline (Verified  Allergy, Unknown, Unknown, 3/26/21)





Diagnoses


Primary Diagnosis:  


1.  Bipolar disorder, mixed.


2.  Dementia, mild unspecified.


3.  Generalized anxiety disorder.


Reasons for Admission:  Aggressive, Agitated, Angry, Anxiety/Panic, Combative


Problem in Patient's Words:  


The behaviors that pt is currently demonstrating  


are the behaviors she was having a few years ago  


when she was placed in her facility. At that  


time, she probably hadn't bathed in nine monthes  


as she was living in an apaprtment and not caring  


for herself.


Additional Admission Comments:  


Per intake pt, was belligerent, refusing  


cares/showers, incontinent, combative, swinging  


at staff, agitated, anxious, verbally abusive,  


name calling, and staff from her facility would  


distance themseleves from her because of her  


behaviors.





Problems


Active Problems:  


Agitation, angry, withdrawn, uncooperative


Inactive Problems:  


None noted at this time.





Pt Strengths/Limitations


Ability for [place]:  Poor


Cognitive Functioning/Ability:  Poor


Communication Skills/Ability:  Poor


Financial Resources:  Poor


Insight/Judgement:  Poor


Intellectual Ability:  Poor


Physical Health:  Fair


Social Skills:  Poor


Stability in Family:  Fair


Stability in School/Work:  Poor


Verbal Skills:  Fair





Discharge Criteria


Discharge Criteria:  No need for close observ., Adequate arrangements @DC, V

erbal commit med comply, Improved mood/thought


Other Discharge Comments:  


None at this time.





Preliminary Discharge Plan


Preliminary DC Plan:  Current Living Arrange.





Special Precautions


Special Precautions:  Agitation/Assault


Fall Risk:  Moderate





Initial D/C Plan





Pt plan is to return to [facility].


Identified Discharge Needs:  


None noted at this time.





Currently Utilized Resources


Currently Utilized Resources/P:  


PCP is Dr. White


Psychiatrist is Dr. Pino


DPOA is kendal Marylou Sarah


Facility is [facility] Community Resources:  


None noted at this time.





Identified Problems/Hx/Goals


Objectives/Short-Term Goals


Short Term Goals:  Control abnormal behavior, Dec. Aggression, Dec. 

Anxiety/Panic, Dec. Outbursts, Dec. Symp. Depression, Medication Stabilization, 

Monitor Med Effects, Promote Coping Skill


Short Term Goals in Patient's:  


Medication eval and adjusments to help with controlling abnormal


behaviors. Help pt to feel better and be less agitated and more compliant


with cares.





Interventions/Frequency


Staff Interventions/Frequency&:  


Psychiatry to assess pt three times per week for medication management.


Nursing to assess behaviors, monitor medications, and complete 15 minute


checks daily.


Social work to see pt at least two times weekly to aid in return to


placement.


Activities to encourage pt to participate in group activities daily.





History


Vocational History:  


Pt was always a . According to pt  


abbeece/Marylou HEREDIA. Pt would not necessarily  


work when she was  as her husbands would  


provide for the home. 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


Education:  


Pt quit school her matilde year as she became  


pregnant at 16 y.o.





Community Follow-up





PCP


Psychiatry


Community Provider/Family Inpu:  


Marylou HEREDIA, aware of pt hospitalization and provided information on


social history. Marylou available as needed for further information.





Treatment Plan Explained


Patient/Representative had this treatment plan explained to him/her as indicated

by the signature below and


has been given the opportunity to ask questions and make suggestions:











Date:  





Patient/Representative Signature: _____________________________________________





Status Update


Update


Pt continues to average eating 75% of meals and sleeps 7.25 hours. Pt generally 

withdrawn mostly to room, but spent more time in group listening to music last 

week. Pt continues to be intermittently resistive with cares. Pt's days seem to 

fluctuate with some good and some not so much. Pt continues to demonstrate 

involuntary movements. Pt found to have hydrocephalus that will be left to 

family and facility to decide weather or not to treat it following psychiatric 

treatment. Pt will return to [facility] once stable.











DAVID GOLDBERG                Apr 12, 2021 12:52

## 2021-04-12 NOTE — NUR
ROB contacted pt Marylou edmonds, to give update. Marylou appreciative of call. SW attempted 
call and left voice message for Caron to give update and ensure that fax was received from 
last week. ROB will call Caron back at another time.

## 2021-04-12 NOTE — PDOC
Exam


Note:


Hayden Note:


This note is a late entry for 04/10/2021 covers elements not covered in my 

initial note.





Subjective:  Dr. Julian had covered for me from 27th March till 9th April, 2021 

and I assumed care of the patients from 10th April.  I have reviewed 

information and interim progress notes at some length with Dr. Julian.  The 

patient was seen individually in the evening of 04/10/2021 with Daphne JUDD, 

discussed and reviewed the chart.  The patient slept 5-1/2 hours previous night.

 She has been incontinent of bowels.  She then took a shower, was calmer after 

that.





Review of Systems:  Ambulation impaired in wheelchair.  No CV, , pulmonary, 

eye system symptoms on review.  Reliability poor.





Mental Status Exam:  The patient is oriented to herself.  Insight and judgment, 

recent and remote memory, attention and concentration, fund of knowledge is poor

consistent with her diagnoses.





Laboratory Data:  Reviewed.





Impression:  Major neurocognitive disorder Alzheimer vascular with delusion, 

depression, behavioral disturbance.  Anxiety disorder unspecified.  Impulse 

control disorder unspecified.





Plan:  I have carefully reviewed the current psychotropics and drug 

interactions.  Continue current psychotropics.  Continue Seroquel, BuSpar, 

Zyprexa p.r.n., trazodone p.r.n.  Adjust further as clinically indicated.





Assessment:


Vital Signs/I&O:





                                   Vital Signs








  Date Time  Temp Pulse Resp B/P (MAP) Pulse Ox O2 Delivery O2 Flow Rate FiO2


 


4/12/21 06:23  84 18 125/79 (94)    


 


4/11/21 15:52 98.3    97   


 


4/11/21 06:37      Room Air  














                                    I & O   


 


 4/11/21 4/11/21 4/12/21





 15:00 23:00 07:00


 


Intake Total 680 ml 445 ml 


 


Balance 680 ml 445 ml 











Current Medications:


Meds:





Current Medications








 Medications


  (Trade)  Dose


 Ordered  Sig/Elizabeth


 Route


 PRN Reason  Start Time


 Stop Time Status Last Admin


Dose Admin


 


 Olanzapine


  (ZyPREXA ZYDIS)  2.5 mg  PRN Q2HR  PRN


 PO


 PSYCHOSIS  3/26/21 22:15


    4/1/21 06:54





 


 Trazodone HCl


  (Desyrel)  50 mg  PRN QHS  PRN


 PO


 INSOMNIA  3/26/21 22:15


    4/11/21 20:09





 


 Buspirone HCl


  (Buspar)  5 mg  TID


 PO


   3/27/21 09:00


    4/11/21 20:10





 


 Quetiapine


 Fumarate


  (SEROquel)  50 mg  BID


 PO


   3/27/21 09:00


    4/11/21 20:09





 


 Ascorbic Acid


  (Vitamin C)  500 mg  DAILY


 PO


   3/27/21 09:00


    4/11/21 07:47





 


 Levothyroxine


 Sodium


  (Synthroid)  88 mcg  DAILY06


 PO


   3/27/21 06:00


 3/28/21 12:05 DC 3/27/21 04:40





 


 Multivitamins/


 Calcium


  (Thera-M Plus)  1 tab  DAILY


 PO


   3/27/21 09:00


    4/11/21 07:47





 


 Acetaminophen


  (Tylenol)  650 mg  PRN Q6HRS  PRN


 PO


 MILD PAIN / TEMP > 100.3'F  3/26/21 23:45


     





 


 Multi-Ingredient


 Ointment


  (Analgesic Balm)  1 chon  PRN QID  PRN


 TP


 MUSCLE PAIN  3/26/21 23:45


     





 


 Al Hydroxide/Mg


 Hydroxide


  (Mylanta Plus Xs)  15 ml  PRN AFTMEALHC  PRN


 PO


 DYSPEPSIA  3/26/21 23:45


     





 


 Magnesium


 Hydroxide


  (Milk Of


 Magnesia)  2,400 mg  PRN QHS  PRN


 PO


 CONSTIPATION  3/26/21 23:45


     





 


 Nicotine


  (Nicoderm Cq


 14mg Patch)  1 patch  DAILY


 TD


   3/27/21 09:00


 4/8/21 22:52 DC 4/8/21 08:39





 


 Levothyroxine


 Sodium


  (Synthroid)  50 mcg  DAILY06


 PO


   3/29/21 06:00


    4/12/21 05:57





 


 Amoxicillin


  (Amoxil)  250 mg  [id number]


 PO


   3/29/21 21:00


 4/5/21 21:00 DC 4/5/21 20:13





 


 Clonazepam


  (KlonoPIN)  0.5 mg  BID


 PO


   3/31/21 21:00


 4/3/21 17:33 DC 4/3/21 08:23





 


 Diazepam


  (Valium)  5 mg  1X  ONCE


 PO


   4/1/21 06:00


 4/1/21 06:01 DC 4/1/21 06:00





 


 Lactobacillus


 Rhamnosus


  (Culturelle)  1 cap  BID


 PO


   4/1/21 09:00


    4/11/21 20:09





 


 Nicotine


  (Nicoderm Cq 7mg


 Patch)  1 patch  DAILY


 TD


   4/9/21 09:00


 4/23/21 11:00  4/11/21 07:46





 


 Polyethylene


 Glycol


  (miraLAX)  17 gm  DAILY


 PO


   4/10/21 09:00


     











I have reviewed the current psychotropics carefully including drug interactions.

 Risk benefit ratio favors no change other than as noted in my dictated progress

note.





Diagnosis:


Problems:  


(1) Major neurocognitive disorder


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


(4) Dementia of the Alzheimer's type with early onset with behavioral 

disturbance


(5) Dementia, vascular, with delusions


(6) Dementia, vascular, with depression


(7) Impulse control disorder, unspecified


(8) Anxiety disorder, unspecified











WILTON JESUS MD                 Apr 12, 2021 07:43

## 2021-04-12 NOTE — NUR
WEEKLY ACTIVITY THERAPY NOTE

Date of Admission: 3/26/21

Date of AT Assessment: 3/29

Precipitating behaviors that initiated intake and admission: pt belligerent, refusing 
cares/showers, becoming combative and swinging at staff, agitated, anxious, verbally 
abusive, and name calling.

Goal aimed:increase socialization and engagement

Initial Goal:Pt will participate in at least one individual or group Activity Therapy 
session before discharge.

Goal changed 4/5: Pt will participate in at least three individual or group Activity Therapy 
session per week.

Weekly progress towards goal: did not achieve, 2/3

Group participation level: 1 min, 1 mod

Weekly highlights: tossed egged independently far distances during egg toss group Wednesday 
afternoon

Behaviors observed: withdrawn to room, limited interest in groups

Plan: no change to goal 

Beneficial adaptations:

## 2021-04-12 NOTE — PDOC
Exam


Note:


Hayden Note:


Please also refer to the separate dictated note~for this date of service 

dictated separately.~Patient seen individually. Discussed the patient with 

Nursing staff reviewed the chart.~Reviewed interim history and current 

functioning. Reviewed vital signs,~Labs/ Radiology~and current medications noted

below. Continue current treatment with the changes noted in the dictated 

addendum note





Assessment:


Vital Signs/I&O:





                                   Vital Signs








  Date Time  Temp Pulse Resp B/P (MAP) Pulse Ox O2 Delivery O2 Flow Rate FiO2


 


4/12/21 16:10 98.6 88 20 84/52 (63) 93   


 


4/11/21 06:37      Room Air  














                                    I & O   


 


 4/11/21 4/11/21 4/12/21





 15:00 23:00 07:00


 


Intake Total 680 ml 445 ml 


 


Balance 680 ml 445 ml 











Current Medications:


Meds:





Current Medications








 Medications


  (Trade)  Dose


 Ordered  Sig/Elizabeth


 Route


 PRN Reason  Start Time


 Stop Time Status Last Admin


Dose Admin


 


 Olanzapine


  (ZyPREXA ZYDIS)  2.5 mg  PRN Q2HR  PRN


 PO


 PSYCHOSIS  3/26/21 22:15


    4/1/21 06:54





 


 Trazodone HCl


  (Desyrel)  50 mg  PRN QHS  PRN


 PO


 INSOMNIA  3/26/21 22:15


    4/12/21 20:03





 


 Buspirone HCl


  (Buspar)  5 mg  TID


 PO


   3/27/21 09:00


    4/12/21 20:03





 


 Quetiapine


 Fumarate


  (SEROquel)  50 mg  BID


 PO


   3/27/21 09:00


    4/12/21 20:04





 


 Ascorbic Acid


  (Vitamin C)  500 mg  DAILY


 PO


   3/27/21 09:00


    4/12/21 12:27





 


 Levothyroxine


 Sodium


  (Synthroid)  88 mcg  DAILY06


 PO


   3/27/21 06:00


 3/28/21 12:05 DC 3/27/21 04:40





 


 Multivitamins/


 Calcium


  (Thera-M Plus)  1 tab  DAILY


 PO


   3/27/21 09:00


    4/12/21 12:27





 


 Acetaminophen


  (Tylenol)  650 mg  PRN Q6HRS  PRN


 PO


 MILD PAIN / TEMP > 100.3'F  3/26/21 23:45


     





 


 Multi-Ingredient


 Ointment


  (Analgesic Balm)  1 chon  PRN QID  PRN


 TP


 MUSCLE PAIN  3/26/21 23:45


     





 


 Al Hydroxide/Mg


 Hydroxide


  (Mylanta Plus Xs)  15 ml  PRN AFTMEALHC  PRN


 PO


 DYSPEPSIA  3/26/21 23:45


     





 


 Magnesium


 Hydroxide


  (Milk Of


 Magnesia)  2,400 mg  PRN QHS  PRN


 PO


 CONSTIPATION  3/26/21 23:45


     





 


 Nicotine


  (Nicoderm Cq


 14mg Patch)  1 patch  DAILY


 TD


   3/27/21 09:00


 4/8/21 22:52 DC 4/8/21 08:39





 


 Levothyroxine


 Sodium


  (Synthroid)  50 mcg  DAILY06


 PO


   3/29/21 06:00


    4/12/21 05:57





 


 Amoxicillin


  (Amoxil)  250 mg  [id number]


 PO


   3/29/21 21:00


 4/5/21 21:00 DC 4/5/21 20:13





 


 Clonazepam


  (KlonoPIN)  0.5 mg  BID


 PO


   3/31/21 21:00


 4/3/21 17:33 DC 4/3/21 08:23





 


 Diazepam


  (Valium)  5 mg  1X  ONCE


 PO


   4/1/21 06:00


 4/1/21 06:01 DC 4/1/21 06:00





 


 Lactobacillus


 Rhamnosus


  (Culturelle)  1 cap  BID


 PO


   4/1/21 09:00


    4/12/21 20:03





 


 Nicotine


  (Nicoderm Cq 7mg


 Patch)  1 patch  DAILY


 TD


   4/9/21 09:00


 4/23/21 11:00  4/12/21 12:28





 


 Polyethylene


 Glycol


  (miraLAX)  17 gm  DAILY


 PO


   4/10/21 09:00


     











I have reviewed the current psychotropics carefully including drug interactions.

 Risk benefit ratio favors no change other than as noted in my dictated progress

note.





Diagnosis:


Problems:  


(1) Bipolar affective disorder, mixed


(2) Anxiety disorder, unspecified


(3) Impulse control disorder, unspecified


(4) Dementia, vascular, with depression


(5) Dementia, vascular, with delusions


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 

disturbance


(9) Major neurocognitive disorder











WILTON JESUS MD                 Apr 12, 2021 22:19

## 2021-04-12 NOTE — NUR
Patient was agitated in dining room at breakfast and threw her tray on the floor.  Patient 
escorted back to her room at that time.  When approached with her morning medications, 
patient stated 'Leave me the fuck alone.'  Will hold morning medications at this time and 
continue to monitor.

## 2021-04-12 NOTE — PDOC
Exam


Note:


Hayden Note:





This note is a late entry for 04/11/2021 covers elements not covered in my 

initial note.





Subjective:  The patient was seen individually in the evening of 04/11/2021 with

Daphne JUDD, discussed and reviewed the chart.  The patient slept 7 hours 

previous night.  She is pleasant.  No aggression noted.  She was singing to 

music earlier in the day.





Review of Systems:  Ambulation impaired in wheelchair.  No CV, , pulmonary, 

eye system symptoms on review.  Reliability poor.





Mental Status Exam:  The patient is oriented to herself.  Insight and judgment, 

recent and remote memory, attention and concentration, fund of knowledge is poor

consistent with her diagnoses.





Laboratory Data:  Reviewed.





Impression:  Major neurocognitive disorder Alzheimer vascular with delusion, 

depression, behavioral disturbance.  Anxiety disorder unspecified.  Impulse 

control disorder unspecified.





Plan:  Continue current psychotropics.





Assessment:


Vital Signs/I&O:





                                   Vital Signs








  Date Time  Temp Pulse Resp B/P (MAP) Pulse Ox O2 Delivery O2 Flow Rate FiO2


 


4/12/21 06:23  84 18 125/79 (94)    


 


4/11/21 15:52 98.3    97   


 


4/11/21 06:37      Room Air  














                                    I & O   


 


 4/11/21 4/11/21 4/12/21





 15:00 23:00 07:00


 


Intake Total 680 ml 445 ml 


 


Balance 680 ml 445 ml 











Current Medications:


Meds:





Current Medications








 Medications


  (Trade)  Dose


 Ordered  Sig/Elizabeth


 Route


 PRN Reason  Start Time


 Stop Time Status Last Admin


Dose Admin


 


 Olanzapine


  (ZyPREXA ZYDIS)  2.5 mg  PRN Q2HR  PRN


 PO


 PSYCHOSIS  3/26/21 22:15


    4/1/21 06:54





 


 Trazodone HCl


  (Desyrel)  50 mg  PRN QHS  PRN


 PO


 INSOMNIA  3/26/21 22:15


    4/11/21 20:09





 


 Buspirone HCl


  (Buspar)  5 mg  TID


 PO


   3/27/21 09:00


    4/11/21 20:10





 


 Quetiapine


 Fumarate


  (SEROquel)  50 mg  BID


 PO


   3/27/21 09:00


    4/11/21 20:09





 


 Ascorbic Acid


  (Vitamin C)  500 mg  DAILY


 PO


   3/27/21 09:00


    4/11/21 07:47





 


 Levothyroxine


 Sodium


  (Synthroid)  88 mcg  DAILY06


 PO


   3/27/21 06:00


 3/28/21 12:05 DC 3/27/21 04:40





 


 Multivitamins/


 Calcium


  (Thera-M Plus)  1 tab  DAILY


 PO


   3/27/21 09:00


    4/11/21 07:47





 


 Acetaminophen


  (Tylenol)  650 mg  PRN Q6HRS  PRN


 PO


 MILD PAIN / TEMP > 100.3'F  3/26/21 23:45


     





 


 Multi-Ingredient


 Ointment


  (Analgesic Balm)  1 chon  PRN QID  PRN


 TP


 MUSCLE PAIN  3/26/21 23:45


     





 


 Al Hydroxide/Mg


 Hydroxide


  (Mylanta Plus Xs)  15 ml  PRN AFTMEALHC  PRN


 PO


 DYSPEPSIA  3/26/21 23:45


     





 


 Magnesium


 Hydroxide


  (Milk Of


 Magnesia)  2,400 mg  PRN QHS  PRN


 PO


 CONSTIPATION  3/26/21 23:45


     





 


 Nicotine


  (Nicoderm Cq


 14mg Patch)  1 patch  DAILY


 TD


   3/27/21 09:00


 4/8/21 22:52 DC 4/8/21 08:39





 


 Levothyroxine


 Sodium


  (Synthroid)  50 mcg  DAILY06


 PO


   3/29/21 06:00


    4/12/21 05:57





 


 Amoxicillin


  (Amoxil)  250 mg  [id number]


 PO


   3/29/21 21:00


 4/5/21 21:00 DC 4/5/21 20:13





 


 Clonazepam


  (KlonoPIN)  0.5 mg  BID


 PO


   3/31/21 21:00


 4/3/21 17:33 DC 4/3/21 08:23





 


 Diazepam


  (Valium)  5 mg  1X  ONCE


 PO


   4/1/21 06:00


 4/1/21 06:01 DC 4/1/21 06:00





 


 Lactobacillus


 Rhamnosus


  (Culturelle)  1 cap  BID


 PO


   4/1/21 09:00


    4/11/21 20:09





 


 Nicotine


  (Nicoderm Cq 7mg


 Patch)  1 patch  DAILY


 TD


   4/9/21 09:00


 4/23/21 11:00  4/11/21 07:46





 


 Polyethylene


 Glycol


  (miraLAX)  17 gm  DAILY


 PO


   4/10/21 09:00


     











I have reviewed the current psychotropics carefully including drug interactions.

 Risk benefit ratio favors no change other than as noted in my dictated progress

note.





Diagnosis:


Problems:  


(1) Anxiety disorder, unspecified


(2) Impulse control disorder, unspecified


(3) Dementia, vascular, with depression


(4) Dementia, vascular, with delusions


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


(7) Dementia of the Alzheimer's type with early onset with behavioral 

disturbance


(8) Major neurocognitive disorder











WILTON JESUS MD                 Apr 12, 2021 08:13

## 2021-04-13 VITALS — DIASTOLIC BLOOD PRESSURE: 51 MMHG | SYSTOLIC BLOOD PRESSURE: 98 MMHG

## 2021-04-13 RX ADMIN — NICOTINE SCH PATCH: 7 PATCH TRANSDERMAL at 10:18

## 2021-04-13 RX ADMIN — QUETIAPINE FUMARATE SCH MG: 50 TABLET, FILM COATED ORAL at 10:19

## 2021-04-13 RX ADMIN — Medication SCH CAP: at 10:19

## 2021-04-13 RX ADMIN — BUSPIRONE HYDROCHLORIDE SCH MG: 5 TABLET ORAL at 14:00

## 2021-04-13 RX ADMIN — QUETIAPINE FUMARATE SCH MG: 50 TABLET, FILM COATED ORAL at 20:02

## 2021-04-13 RX ADMIN — POLYETHYLENE GLYCOL 3350 SCH GM: 17 POWDER, FOR SOLUTION ORAL at 10:19

## 2021-04-13 RX ADMIN — BUSPIRONE HYDROCHLORIDE SCH MG: 5 TABLET ORAL at 20:02

## 2021-04-13 RX ADMIN — Medication SCH CAP: at 20:02

## 2021-04-13 RX ADMIN — LEVOTHYROXINE SODIUM SCH MCG: 50 TABLET ORAL at 10:19

## 2021-04-13 RX ADMIN — MULTIPLE VITAMINS W/ MINERALS TAB SCH TAB: TAB at 10:19

## 2021-04-13 RX ADMIN — BUSPIRONE HYDROCHLORIDE SCH MG: 5 TABLET ORAL at 10:19

## 2021-04-13 RX ADMIN — OXYCODONE HYDROCHLORIDE AND ACETAMINOPHEN SCH MG: 500 TABLET ORAL at 10:19

## 2021-04-13 NOTE — NUR
PT RESISTIVE TO CARES THIS AM. FIGHTING AND YELLING OUT PROFANITIES WHILE CHANGING SOILED 
BRIEF. PT CALMED DIRECTLY AFTER INTERACTION. REFUSED TO TAKE SYNTHROID AT THIS TIME. WILL 
INFORM ONCOMING RN TO TRY AGAIN WITH MORNING PILLS.

## 2021-04-13 NOTE — PN
DATE:  04/05/2021



SUBJECTIVE:  The patient denies any new medical or neurological complaints.  She

continues to have abnormal involuntarily movements of the upper and lower

extremities and has tendency to fall.



OBJECTIVE:

GENERAL:  Well-developed, well-nourished female, not in acute distress.

VITAL SIGNS:  Blood pressure 104/67, respiratory rate 16, pulse is 83,

temperature 98.4, and oxygen saturation 95% on room air.

HEENT:  Normocephalic, atraumatic, otherwise unremarkable.

NECK:  Supple.  Negative for carotid bruit, lymphadenopathy, or thyromegaly.

LUNGS:  Clear to A and P.

CARDIOVASCULAR:  Regular rate and rhythm, normal S1, S2.

ABDOMEN:  Soft.  Bowel sounds positive.

EXTREMITIES:  Negative for cyanosis, clubbing, or pitting edema.

NEUROLOGIC EXAM:  Mental Status:  The patient is alert and oriented to herself

and situation.  Speech is fluent.  No language dysfunction.  Memory, judgment,

and abstract thinking are fair.  The patient denies hallucination or delusion. 

Cranial nerves are intact.  Motor examination:  No focal muscle bulk wasting. 

The strength was 4/5 throughout.  The patient continues to have choreic type of

movements of the upper and lower extremities.  Gait:  Tandem gait is difficult. 

Deep tendon reflexes were hypoactive with absent Achilles responses.  Gait, the

patient has involuntarily movement as described above and she is unsteady and

has tendency to fall.



LABORATORY DATA:  From 04/03/2021 revealed sodium of 142, potassium 4.1,

chloride 106, CO2 of 22, ____, glucose 204, calcium 9.  CBC revealed white blood

cells of 6.6 thousand, hemoglobin 13.5, hematocrit is 41.2, platelet count

226,000.  Coagulation:  D-dimer is high at 2.15.  Urinalysis revealed white

blood cells of 5-10 with many bacteria, but negative urinary leukocyte esterase

and negative for nitrite.  The urine culture on the day of admission revealed

more than 100,000 aerobic cocci and suggestive of urinary tract infections.



IMPRESSION:

1.  Dementia.

2.  Bipolar disorders with generalized anxiety disorder.

3.  Involuntarily movement, etiology uncertain, probably due to normal pressure

hydrocephalus.

4.  History of alcohol dependency.



RECOMMENDATIONS:

1.  Continue with current medical and psychiatric care including physical

therapy as tolerated.

2.  To have a neurosurgical consult on outpatient basis to evaluate her for

hydrocephalus and possible surgical intervention.





______________________________

M UZIEL CERON MD



DR:  MARLEN/willow  JOB#:  [id number] / [id number]

DD:  04/12/2021 22:37  DT:  04/13/2021 00:58

USMAN

## 2021-04-13 NOTE — PDOC
Exam


Note:


Hayden Note:


This note is a late entry for 04/12/2021 covers elements not covered in my 

initial note.





Subjective:  The patient was reviewed in the morning of 04/12/2021 for a 

treatment team meeting with Margarita Hsu, Bhavani Christensen and Shahnaz (social 

services), Kiarra, activity therapy and Constantino JUDD, discussed and reviewed the 

chart.  The patient slept 7-1/4 hours previous night.  Appetite is 75%.  She is 

somewhat withdrawn.  She does have hydrocephalus on CT head.  We will defer to 

Dr. Benjamin and towards evening she did better, more social.





Review of Systems:  Ambulation impaired in wheelchair.  No CV, , pulmonary, 

eye system symptoms on review.





Mental Status Exam:  The patient is oriented to herself.  Insight and judgment, 

recent and remote memory, attention and concentration, fund of knowledge is poor

consistent with her diagnoses.





Laboratory Data:  Reviewed.





Impression:  Major neurocognitive disorder Alzheimer vascular with delusion, 

depression, behavioral disturbance.  Anxiety disorder unspecified.  Impulse 

control disorder unspecified.





Plan:  Maintain current psychotropics.  Adjust further as clinically indicated.





Assessment:


Vital Signs/I&O:





                                   Vital Signs








  Date Time  Temp Pulse Resp B/P (MAP) Pulse Ox O2 Delivery O2 Flow Rate FiO2


 


4/12/21 16:10 98.6 88 20 84/52 (63) 93   


 


4/11/21 06:37      Room Air  














                                    I & O   


 


 4/12/21 4/12/21 4/13/21





 15:00 23:00 07:00


 


Intake Total 120 ml 480 ml 


 


Balance 120 ml 480 ml 











Current Medications:


Meds:





Current Medications








 Medications


  (Trade)  Dose


 Ordered  Sig/Elizabeth


 Route


 PRN Reason  Start Time


 Stop Time Status Last Admin


Dose Admin


 


 Olanzapine


  (ZyPREXA ZYDIS)  2.5 mg  PRN Q2HR  PRN


 PO


 PSYCHOSIS  3/26/21 22:15


    4/1/21 06:54





 


 Trazodone HCl


  (Desyrel)  50 mg  PRN QHS  PRN


 PO


 INSOMNIA  3/26/21 22:15


    4/12/21 20:03





 


 Buspirone HCl


  (Buspar)  5 mg  TID


 PO


   3/27/21 09:00


    4/12/21 20:03





 


 Quetiapine


 Fumarate


  (SEROquel)  50 mg  BID


 PO


   3/27/21 09:00


    4/12/21 20:04





 


 Ascorbic Acid


  (Vitamin C)  500 mg  DAILY


 PO


   3/27/21 09:00


    4/12/21 12:27





 


 Levothyroxine


 Sodium


  (Synthroid)  88 mcg  DAILY06


 PO


   3/27/21 06:00


 3/28/21 12:05 DC 3/27/21 04:40





 


 Multivitamins/


 Calcium


  (Thera-M Plus)  1 tab  DAILY


 PO


   3/27/21 09:00


    4/12/21 12:27





 


 Acetaminophen


  (Tylenol)  650 mg  PRN Q6HRS  PRN


 PO


 MILD PAIN / TEMP > 100.3'F  3/26/21 23:45


     





 


 Multi-Ingredient


 Ointment


  (Analgesic Balm)  1 chon  PRN QID  PRN


 TP


 MUSCLE PAIN  3/26/21 23:45


     





 


 Al Hydroxide/Mg


 Hydroxide


  (Mylanta Plus Xs)  15 ml  PRN AFTMEALHC  PRN


 PO


 DYSPEPSIA  3/26/21 23:45


     





 


 Magnesium


 Hydroxide


  (Milk Of


 Magnesia)  2,400 mg  PRN QHS  PRN


 PO


 CONSTIPATION  3/26/21 23:45


     





 


 Nicotine


  (Nicoderm Cq


 14mg Patch)  1 patch  DAILY


 TD


   3/27/21 09:00


 4/8/21 22:52 DC 4/8/21 08:39





 


 Levothyroxine


 Sodium


  (Synthroid)  50 mcg  DAILY06


 PO


   3/29/21 06:00


    4/12/21 05:57





 


 Amoxicillin


  (Amoxil)  250 mg  [id number]


 PO


   3/29/21 21:00


 4/5/21 21:00 DC 4/5/21 20:13





 


 Clonazepam


  (KlonoPIN)  0.5 mg  BID


 PO


   3/31/21 21:00


 4/3/21 17:33 DC 4/3/21 08:23





 


 Diazepam


  (Valium)  5 mg  1X  ONCE


 PO


   4/1/21 06:00


 4/1/21 06:01 DC 4/1/21 06:00





 


 Lactobacillus


 Rhamnosus


  (Culturelle)  1 cap  BID


 PO


   4/1/21 09:00


    4/12/21 20:03





 


 Nicotine


  (Nicoderm Cq 7mg


 Patch)  1 patch  DAILY


 TD


   4/9/21 09:00


 4/23/21 11:00  4/12/21 12:28





 


 Polyethylene


 Glycol


  (miraLAX)  17 gm  DAILY


 PO


   4/10/21 09:00


     











I have reviewed the current psychotropics carefully including drug interactions.

 Risk benefit ratio favors no change other than as noted in my dictated progress

note.





Diagnosis:


Problems:  


(1) Bipolar affective disorder, mixed


(2) Anxiety disorder, unspecified


(3) Impulse control disorder, unspecified


(4) Dementia, vascular, with depression


(5) Dementia, vascular, with delusions


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 

disturbance


(9) Major neurocognitive disorder











WILTON JESUS MD                 Apr 13, 2021 08:51

## 2021-04-13 NOTE — PDOC
Exam


Note:


Hayden Note:


Please also refer to the separate dictated note~for this date of service 

dictated separately.~Patient seen individually. Discussed the patient with 

Nursing staff reviewed the chart.~Reviewed interim history and current 

functioning. Reviewed vital signs,~Labs/ Radiology~and current medications noted

below. Continue current treatment with the changes noted in the dictated 

addendum note





Assessment:


Vital Signs/I&O:





                                   Vital Signs








  Date Time  Temp Pulse Resp B/P (MAP) Pulse Ox O2 Delivery O2 Flow Rate FiO2


 


4/13/21 16:23 97.5 75 20 98/51 (67) 96   


 


4/11/21 06:37      Room Air  














                                    I & O   


 


 4/12/21 4/12/21 4/13/21





 15:00 23:00 07:00


 


Intake Total 120 ml 480 ml 


 


Balance 120 ml 480 ml 











Current Medications:


Meds:





Current Medications








 Medications


  (Trade)  Dose


 Ordered  Sig/Elizabeth


 Route


 PRN Reason  Start Time


 Stop Time Status Last Admin


Dose Admin


 


 Olanzapine


  (ZyPREXA ZYDIS)  2.5 mg  PRN Q2HR  PRN


 PO


 PSYCHOSIS  3/26/21 22:15


    4/1/21 06:54





 


 Trazodone HCl


  (Desyrel)  50 mg  PRN QHS  PRN


 PO


 INSOMNIA  3/26/21 22:15


    4/12/21 20:03





 


 Buspirone HCl


  (Buspar)  5 mg  TID


 PO


   3/27/21 09:00


    4/13/21 20:02





 


 Quetiapine


 Fumarate


  (SEROquel)  50 mg  BID


 PO


   3/27/21 09:00


    4/13/21 20:02





 


 Ascorbic Acid


  (Vitamin C)  500 mg  DAILY


 PO


   3/27/21 09:00


    4/13/21 10:19





 


 Levothyroxine


 Sodium


  (Synthroid)  88 mcg  DAILY06


 PO


   3/27/21 06:00


 3/28/21 12:05 DC 3/27/21 04:40





 


 Multivitamins/


 Calcium


  (Thera-M Plus)  1 tab  DAILY


 PO


   3/27/21 09:00


    4/13/21 10:19





 


 Acetaminophen


  (Tylenol)  650 mg  PRN Q6HRS  PRN


 PO


 MILD PAIN / TEMP > 100.3'F  3/26/21 23:45


     





 


 Multi-Ingredient


 Ointment


  (Analgesic Balm)  1 chon  PRN QID  PRN


 TP


 MUSCLE PAIN  3/26/21 23:45


     





 


 Al Hydroxide/Mg


 Hydroxide


  (Mylanta Plus Xs)  15 ml  PRN AFTMEALHC  PRN


 PO


 DYSPEPSIA  3/26/21 23:45


     





 


 Magnesium


 Hydroxide


  (Milk Of


 Magnesia)  2,400 mg  PRN QHS  PRN


 PO


 CONSTIPATION  3/26/21 23:45


     





 


 Nicotine


  (Nicoderm Cq


 14mg Patch)  1 patch  DAILY


 TD


   3/27/21 09:00


 4/8/21 22:52 DC 4/8/21 08:39





 


 Levothyroxine


 Sodium


  (Synthroid)  50 mcg  DAILY06


 PO


   3/29/21 06:00


    4/13/21 10:19





 


 Amoxicillin


  (Amoxil)  250 mg  [id number]


 PO


   3/29/21 21:00


 4/5/21 21:00 DC 4/5/21 20:13





 


 Clonazepam


  (KlonoPIN)  0.5 mg  BID


 PO


   3/31/21 21:00


 4/3/21 17:33 DC 4/3/21 08:23





 


 Diazepam


  (Valium)  5 mg  1X  ONCE


 PO


   4/1/21 06:00


 4/1/21 06:01 DC 4/1/21 06:00





 


 Lactobacillus


 Rhamnosus


  (Culturelle)  1 cap  BID


 PO


   4/1/21 09:00


    4/13/21 20:02





 


 Nicotine


  (Nicoderm Cq 7mg


 Patch)  1 patch  DAILY


 TD


   4/9/21 09:00


 4/23/21 11:00  4/13/21 10:18





 


 Polyethylene


 Glycol


  (miraLAX)  17 gm  DAILY


 PO


   4/10/21 09:00


    4/13/21 10:19





 


 Sertraline HCl


  (Zoloft)  25 mg  DAILY


 PO


   4/14/21 09:00


 4/16/21 23:50   





 


 Sertraline HCl


  (Zoloft)  50 mg  DAILY


 PO


   4/17/21 09:00


     











I have reviewed the current psychotropics carefully including drug interactions.

 Risk benefit ratio favors no change other than as noted in my dictated progress

note.





Diagnosis:


Problems:  


(1) Bipolar affective disorder, mixed


(2) Anxiety disorder, unspecified


(3) Impulse control disorder, unspecified


(4) Dementia, vascular, with depression


(5) Dementia, vascular, with delusions


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

isturbance


(9) Major neurocognitive disorder











WILTON JESUS MD                 Apr 13, 2021 22:01

## 2021-04-13 NOTE — NUR
Patient has been calm, disorganized, and pleasantly confused during this shift.  She was 
social and interactive throughout the day; she did spend a large part of the afternoon in 
the day room participating in groups.  Will continue to monitor and report to oncoming 
shift.

## 2021-04-14 VITALS — DIASTOLIC BLOOD PRESSURE: 70 MMHG | SYSTOLIC BLOOD PRESSURE: 90 MMHG

## 2021-04-14 VITALS — SYSTOLIC BLOOD PRESSURE: 115 MMHG | DIASTOLIC BLOOD PRESSURE: 70 MMHG

## 2021-04-14 RX ADMIN — BUSPIRONE HYDROCHLORIDE SCH MG: 5 TABLET ORAL at 10:18

## 2021-04-14 RX ADMIN — MULTIPLE VITAMINS W/ MINERALS TAB SCH TAB: TAB at 10:18

## 2021-04-14 RX ADMIN — BUSPIRONE HYDROCHLORIDE SCH MG: 5 TABLET ORAL at 20:23

## 2021-04-14 RX ADMIN — NICOTINE SCH PATCH: 7 PATCH TRANSDERMAL at 10:18

## 2021-04-14 RX ADMIN — QUETIAPINE FUMARATE SCH MG: 50 TABLET, FILM COATED ORAL at 21:00

## 2021-04-14 RX ADMIN — LEVOTHYROXINE SODIUM SCH MCG: 50 TABLET ORAL at 06:05

## 2021-04-14 RX ADMIN — QUETIAPINE FUMARATE SCH MG: 50 TABLET, FILM COATED ORAL at 10:18

## 2021-04-14 RX ADMIN — QUETIAPINE FUMARATE SCH MG: 50 TABLET, FILM COATED ORAL at 20:23

## 2021-04-14 RX ADMIN — SERTRALINE SCH MG: 25 TABLET, FILM COATED ORAL at 10:18

## 2021-04-14 RX ADMIN — TRAZODONE HYDROCHLORIDE PRN MG: 50 TABLET, FILM COATED ORAL at 20:24

## 2021-04-14 RX ADMIN — Medication SCH CAP: at 21:00

## 2021-04-14 RX ADMIN — BUSPIRONE HYDROCHLORIDE SCH MG: 5 TABLET ORAL at 21:00

## 2021-04-14 RX ADMIN — OXYCODONE HYDROCHLORIDE AND ACETAMINOPHEN SCH MG: 500 TABLET ORAL at 10:18

## 2021-04-14 RX ADMIN — POLYETHYLENE GLYCOL 3350 SCH GM: 17 POWDER, FOR SOLUTION ORAL at 10:18

## 2021-04-14 RX ADMIN — BUSPIRONE HYDROCHLORIDE SCH MG: 5 TABLET ORAL at 14:35

## 2021-04-14 RX ADMIN — Medication SCH CAP: at 20:23

## 2021-04-14 RX ADMIN — Medication SCH CAP: at 10:18

## 2021-04-14 NOTE — NUR
Nursing Note

Pt in day room, withdrawn to self, calm and cooperative.  Movements to her upper extremities 
have significantly reduced and pt is able to hold a glass of water herself and her meds 
without throwing them.  Pt in day room watching TV social. No agitation.

## 2021-04-14 NOTE — PDOC
Exam


Note:


Hayden Note:


This note is a late entry for 04/13/2021 covers elements not covered in my 

initial note.





Subjective:  The patient was seen individually in the evening of 04/13/2021 with

Constantino JUDD, discussed and reviewed the chart.  The patient slept 6-1/2 hours 

previous night.  Per nursing report she has one day thats good but the next is 

bad.  Yesterday she had great difficulty but today she is agitated when her 

vitals were being checked, combative but then better after that.





Review of Systems:  Positive for movement disorder.  No CV, , pulmonary, eye, 

ENT system symptoms on review.  Reliability poor.





Mental Status Exam:  The patient is oriented to herself.  Insight and judgment, 

recent and remote memory, attention and concentration, fund of knowledge is poor

consistent with her diagnoses.





Laboratory Data:  Reviewed.





Impression:  Major neurocognitive disorder Alzheimer vascular with delusion, 

depression, behavioral disturbance.  Anxiety disorder unspecified.  Impulse 

control disorder unspecified.





Plan:  Maintain current psychotropics.  We will start her on Zoloft 25 mg a day 

for 3 days, then 50 mg a day thereafter.  Continue rest unchanged.





Assessment:


Vital Signs/I&O:





                                   Vital Signs








  Date Time  Temp Pulse Resp B/P (MAP) Pulse Ox O2 Delivery O2 Flow Rate FiO2


 


4/14/21 06:38 97.1 70 20 115/70 (85)    


 


4/13/21 16:23     96   


 


4/11/21 06:37      Room Air  














                                    I & O   


 


 4/13/21 4/13/21 4/14/21





 14:59 22:59 06:59


 


Intake Total 840 ml 360 ml 


 


Balance 840 ml 360 ml 











Current Medications:


Meds:





Current Medications








 Medications


  (Trade)  Dose


 Ordered  Sig/Elizabeth


 Route


 PRN Reason  Start Time


 Stop Time Status Last Admin


Dose Admin


 


 Olanzapine


  (ZyPREXA ZYDIS)  2.5 mg  PRN Q2HR  PRN


 PO


 PSYCHOSIS  3/26/21 22:15


    4/1/21 06:54





 


 Trazodone HCl


  (Desyrel)  50 mg  PRN QHS  PRN


 PO


 INSOMNIA  3/26/21 22:15


    4/12/21 20:03





 


 Buspirone HCl


  (Buspar)  5 mg  TID


 PO


   3/27/21 09:00


    4/13/21 20:02





 


 Quetiapine


 Fumarate


  (SEROquel)  50 mg  BID


 PO


   3/27/21 09:00


    4/13/21 20:02





 


 Ascorbic Acid


  (Vitamin C)  500 mg  DAILY


 PO


   3/27/21 09:00


    4/13/21 10:19





 


 Levothyroxine


 Sodium


  (Synthroid)  88 mcg  DAILY06


 PO


   3/27/21 06:00


 3/28/21 12:05 DC 3/27/21 04:40





 


 Multivitamins/


 Calcium


  (Thera-M Plus)  1 tab  DAILY


 PO


   3/27/21 09:00


    4/13/21 10:19





 


 Acetaminophen


  (Tylenol)  650 mg  PRN Q6HRS  PRN


 PO


 MILD PAIN / TEMP > 100.3'F  3/26/21 23:45


     





 


 Multi-Ingredient


 Ointment


  (Analgesic Balm)  1 chon  PRN QID  PRN


 TP


 MUSCLE PAIN  3/26/21 23:45


     





 


 Al Hydroxide/Mg


 Hydroxide


  (Mylanta Plus Xs)  15 ml  PRN AFTMEALHC  PRN


 PO


 DYSPEPSIA  3/26/21 23:45


     





 


 Magnesium


 Hydroxide


  (Milk Of


 Magnesia)  2,400 mg  PRN QHS  PRN


 PO


 CONSTIPATION  3/26/21 23:45


     





 


 Nicotine


  (Nicoderm Cq


 14mg Patch)  1 patch  DAILY


 TD


   3/27/21 09:00


 4/8/21 22:52 DC 4/8/21 08:39





 


 Levothyroxine


 Sodium


  (Synthroid)  50 mcg  DAILY06


 PO


   3/29/21 06:00


    4/14/21 06:05





 


 Amoxicillin


  (Amoxil)  250 mg  [id number]


 PO


   3/29/21 21:00


 4/5/21 21:00 DC 4/5/21 20:13





 


 Clonazepam


  (KlonoPIN)  0.5 mg  BID


 PO


   3/31/21 21:00


 4/3/21 17:33 DC 4/3/21 08:23





 


 Diazepam


  (Valium)  5 mg  1X  ONCE


 PO


   4/1/21 06:00


 4/1/21 06:01 DC 4/1/21 06:00





 


 Lactobacillus


 Rhamnosus


  (Culturelle)  1 cap  BID


 PO


   4/1/21 09:00


    4/13/21 20:02





 


 Nicotine


  (Nicoderm Cq 7mg


 Patch)  1 patch  DAILY


 TD


   4/9/21 09:00


 4/23/21 11:00  4/13/21 10:18





 


 Polyethylene


 Glycol


  (miraLAX)  17 gm  DAILY


 PO


   4/10/21 09:00


    4/13/21 10:19





 


 Sertraline HCl


  (Zoloft)  25 mg  DAILY


 PO


   4/14/21 09:00


 4/16/21 23:50   





 


 Sertraline HCl


  (Zoloft)  50 mg  DAILY


 PO


   4/17/21 09:00


     











I have reviewed the current psychotropics carefully including drug interactions.

 Risk benefit ratio favors no change other than as noted in my dictated progress

note.





Diagnosis:


Problems:  


(1) Bipolar affective disorder, mixed


(2) Anxiety disorder, unspecified


(3) Impulse control disorder, unspecified


(4) Dementia, vascular, with depression


(5) Dementia, vascular, with delusions


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 

disturbance


(9) Major neurocognitive disorder











WILTON JESUS MD                 Apr 14, 2021 07:48

## 2021-04-14 NOTE — PDOC
Exam


Note:


Hayden Note:


Please also refer to the separate dictated note~for this date of service 

dictated separately.~Patient seen individually. Discussed the patient with 

Nursing staff reviewed the chart.~Reviewed interim history and current 

functioning. Reviewed vital signs,~Labs/ Radiology~and current medications noted

below. Continue current treatment with the changes noted in the dictated 

addendum note





Assessment:


Vital Signs/I&O:





                                   Vital Signs








  Date Time  Temp Pulse Resp B/P (MAP) Pulse Ox O2 Delivery O2 Flow Rate FiO2


 


4/14/21 15:52 98.0 82 20 90/70 (77) 95   


 


4/11/21 06:37      Room Air  














                                    I & O   


 


 4/13/21 4/13/21 4/14/21





 15:00 23:00 07:00


 


Intake Total 840 ml 360 ml 


 


Balance 840 ml 360 ml 











Current Medications:


Meds:





Current Medications








 Medications


  (Trade)  Dose


 Ordered  Sig/Elizabeth


 Route


 PRN Reason  Start Time


 Stop Time Status Last Admin


Dose Admin


 


 Olanzapine


  (ZyPREXA ZYDIS)  2.5 mg  PRN Q2HR  PRN


 PO


 PSYCHOSIS  3/26/21 22:15


    4/1/21 06:54





 


 Trazodone HCl


  (Desyrel)  50 mg  PRN QHS  PRN


 PO


 INSOMNIA  3/26/21 22:15


    4/14/21 20:24





 


 Buspirone HCl


  (Buspar)  5 mg  TID


 PO


   3/27/21 09:00


    4/14/21 20:23





 


 Quetiapine


 Fumarate


  (SEROquel)  50 mg  BID


 PO


   3/27/21 09:00


    4/14/21 20:23





 


 Ascorbic Acid


  (Vitamin C)  500 mg  DAILY


 PO


   3/27/21 09:00


    4/14/21 10:18





 


 Levothyroxine


 Sodium


  (Synthroid)  88 mcg  DAILY06


 PO


   3/27/21 06:00


 3/28/21 12:05 DC 3/27/21 04:40





 


 Multivitamins/


 Calcium


  (Thera-M Plus)  1 tab  DAILY


 PO


   3/27/21 09:00


    4/14/21 10:18





 


 Acetaminophen


  (Tylenol)  650 mg  PRN Q6HRS  PRN


 PO


 MILD PAIN / TEMP > 100.3'F  3/26/21 23:45


     





 


 Multi-Ingredient


 Ointment


  (Analgesic Balm)  1 chon  PRN QID  PRN


 TP


 MUSCLE PAIN  3/26/21 23:45


     





 


 Al Hydroxide/Mg


 Hydroxide


  (Mylanta Plus Xs)  15 ml  PRN AFTMEALHC  PRN


 PO


 DYSPEPSIA  3/26/21 23:45


     





 


 Magnesium


 Hydroxide


  (Milk Of


 Magnesia)  2,400 mg  PRN QHS  PRN


 PO


 CONSTIPATION  3/26/21 23:45


     





 


 Nicotine


  (Nicoderm Cq


 14mg Patch)  1 patch  DAILY


 TD


   3/27/21 09:00


 4/8/21 22:52 DC 4/8/21 08:39





 


 Levothyroxine


 Sodium


  (Synthroid)  50 mcg  DAILY06


 PO


   3/29/21 06:00


    4/14/21 06:05





 


 Amoxicillin


  (Amoxil)  250 mg  [id number]


 PO


   3/29/21 21:00


 4/5/21 21:00 DC 4/5/21 20:13





 


 Clonazepam


  (KlonoPIN)  0.5 mg  BID


 PO


   3/31/21 21:00


 4/3/21 17:33 DC 4/3/21 08:23





 


 Diazepam


  (Valium)  5 mg  1X  ONCE


 PO


   4/1/21 06:00


 4/1/21 06:01 DC 4/1/21 06:00





 


 Lactobacillus


 Rhamnosus


  (Culturelle)  1 cap  BID


 PO


   4/1/21 09:00


    4/14/21 20:23





 


 Nicotine


  (Nicoderm Cq 7mg


 Patch)  1 patch  DAILY


 TD


   4/9/21 09:00


 4/23/21 11:00  4/14/21 10:18





 


 Polyethylene


 Glycol


  (miraLAX)  17 gm  DAILY


 PO


   4/10/21 09:00


    4/14/21 10:18





 


 Sertraline HCl


  (Zoloft)  25 mg  DAILY


 PO


   4/14/21 09:00


 4/16/21 23:50  4/14/21 10:18





 


 Sertraline HCl


  (Zoloft)  50 mg  DAILY


 PO


   4/17/21 09:00


     











Current Medications








 Medications


  (Trade)  Dose


 Ordered  Sig/Elizabeth


 Route


 PRN Reason  Start Time


 Stop Time Status Last Admin


Dose Admin


 


 Sertraline HCl


  (Zoloft)  25 mg  DAILY


 PO


   4/14/21 09:00


 4/16/21 23:50  4/14/21 10:18











I have reviewed the current psychotropics carefully including drug interactions.

 Risk benefit ratio favors no change other than as noted in my dictated progress

note.





Diagnosis:


Problems:  


(1) Bipolar affective disorder, mixed


(2) Anxiety disorder, unspecified


(3) Impulse control disorder, unspecified


(4) Dementia, vascular, with depression


(5) Dementia, vascular, with delusions


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


(8) Dementia of the Alzheimer's type with early onset with behavioral disturba

nce


(9) Major neurocognitive disorder











WILTON JESUS MD                 Apr 14, 2021 22:02

## 2021-04-14 NOTE — NUR
Pt highly agitated and irritable this evening. When approached with HS medications, pt began 
yelling and swinging at this RN, stating "go away mother fucsusie." Attempted to administer HS 
medications numerous times; each time pt yelling and refusing. Pt currently refusing to go 
to bed and is sitting in the hallway yelling/ mumbling to herself. Will continue to monitor.

## 2021-04-14 NOTE — NUR
Patient has been calm, disorganized, and pleasantly confused during most of this shift.  She 
was social and interactive throughout the day; she did spend a large part of the afternoon 
in the day room participating in groups. Patient became agitated after receiving a phone 
call from a family member because she wanted the family member to come pick her up.  She 
calmed down after about an hour. Will continue to monitor and report to oncoming shift.

## 2021-04-15 VITALS — SYSTOLIC BLOOD PRESSURE: 122 MMHG | DIASTOLIC BLOOD PRESSURE: 73 MMHG

## 2021-04-15 VITALS — DIASTOLIC BLOOD PRESSURE: 67 MMHG | SYSTOLIC BLOOD PRESSURE: 106 MMHG

## 2021-04-15 LAB
ALBUMIN SERPL-MCNC: 3.3 G/DL (ref 3.4–5)
ALBUMIN/GLOB SERPL: 0.8 {RATIO} (ref 1–1.7)
ALP SERPL-CCNC: 100 U/L (ref 46–116)
ALT SERPL-CCNC: 22 U/L (ref 14–59)
ANION GAP SERPL CALC-SCNC: 12 MMOL/L (ref 6–14)
AST SERPL-CCNC: 15 U/L (ref 15–37)
BASOPHILS # BLD AUTO: 0.1 X10^3/UL (ref 0–0.2)
BASOPHILS NFR BLD: 1 % (ref 0–3)
BILIRUB SERPL-MCNC: 0.3 MG/DL (ref 0.2–1)
BUN/CREAT SERPL: 23 (ref 6–20)
CA-I SERPL ISE-MCNC: 23 MG/DL (ref 7–20)
CALCIUM SERPL-MCNC: 9.1 MG/DL (ref 8.5–10.1)
CHLORIDE SERPL-SCNC: 107 MMOL/L (ref 98–107)
CO2 SERPL-SCNC: 23 MMOL/L (ref 21–32)
CREAT SERPL-MCNC: 1 MG/DL (ref 0.6–1)
EOSINOPHIL NFR BLD: 0.2 X10^3/UL (ref 0–0.7)
EOSINOPHIL NFR BLD: 2 % (ref 0–3)
ERYTHROCYTE [DISTWIDTH] IN BLOOD BY AUTOMATED COUNT: 13.7 % (ref 11.5–14.5)
GFR SERPLBLD BASED ON 1.73 SQ M-ARVRAT: 54.8 ML/MIN
GLOBULIN SER-MCNC: 4 G/DL (ref 2.2–3.8)
GLUCOSE SERPL-MCNC: 104 MG/DL (ref 70–99)
HCT VFR BLD CALC: 40.3 % (ref 36–47)
HGB BLD-MCNC: 13.3 G/DL (ref 12–15.5)
LYMPHOCYTES # BLD: 2.2 X10^3/UL (ref 1–4.8)
LYMPHOCYTES NFR BLD AUTO: 27 % (ref 24–48)
MCH RBC QN AUTO: 32 PG (ref 25–35)
MCHC RBC AUTO-ENTMCNC: 33 G/DL (ref 31–37)
MCV RBC AUTO: 96 FL (ref 79–100)
MONO #: 0.7 X10^3/UL (ref 0–1.1)
MONOCYTES NFR BLD: 8 % (ref 0–9)
NEUT #: 5.2 X10^3UL (ref 1.8–7.7)
NEUTROPHILS NFR BLD AUTO: 62 % (ref 31–73)
PLATELET # BLD AUTO: 271 X10^3/UL (ref 140–400)
POTASSIUM SERPL-SCNC: 4.3 MMOL/L (ref 3.5–5.1)
PROT SERPL-MCNC: 7.3 G/DL (ref 6.4–8.2)
RBC # BLD AUTO: 4.22 X10^6/UL (ref 3.5–5.4)
SODIUM SERPL-SCNC: 142 MMOL/L (ref 136–145)
WBC # BLD AUTO: 8.3 X10^3/UL (ref 4–11)

## 2021-04-15 RX ADMIN — Medication SCH CAP: at 08:50

## 2021-04-15 RX ADMIN — BUSPIRONE HYDROCHLORIDE SCH MG: 5 TABLET ORAL at 13:35

## 2021-04-15 RX ADMIN — OXYCODONE HYDROCHLORIDE AND ACETAMINOPHEN SCH MG: 500 TABLET ORAL at 08:50

## 2021-04-15 RX ADMIN — LEVOTHYROXINE SODIUM SCH MCG: 50 TABLET ORAL at 08:49

## 2021-04-15 RX ADMIN — POLYETHYLENE GLYCOL 3350 SCH GM: 17 POWDER, FOR SOLUTION ORAL at 08:50

## 2021-04-15 RX ADMIN — NICOTINE SCH PATCH: 7 PATCH TRANSDERMAL at 08:50

## 2021-04-15 RX ADMIN — TRAZODONE HYDROCHLORIDE PRN MG: 50 TABLET, FILM COATED ORAL at 20:24

## 2021-04-15 RX ADMIN — MULTIPLE VITAMINS W/ MINERALS TAB SCH TAB: TAB at 08:50

## 2021-04-15 RX ADMIN — BUSPIRONE HYDROCHLORIDE SCH MG: 5 TABLET ORAL at 20:22

## 2021-04-15 RX ADMIN — BUSPIRONE HYDROCHLORIDE SCH MG: 5 TABLET ORAL at 08:50

## 2021-04-15 RX ADMIN — QUETIAPINE FUMARATE SCH MG: 50 TABLET, FILM COATED ORAL at 20:22

## 2021-04-15 RX ADMIN — QUETIAPINE FUMARATE SCH MG: 50 TABLET, FILM COATED ORAL at 08:50

## 2021-04-15 RX ADMIN — SERTRALINE SCH MG: 25 TABLET, FILM COATED ORAL at 08:50

## 2021-04-15 RX ADMIN — Medication SCH CAP: at 20:23

## 2021-04-15 NOTE — PDOC
Exam


Note:


Hayden Note:


Please also refer to the separate dictated note~for this date of service 

dictated separately.~Patient seen individually. Discussed the patient with 

Nursing staff reviewed the chart.~Reviewed interim history and current 

functioning. Reviewed vital signs,~Labs/ Radiology~and current medications noted

below. Continue current treatment with the changes noted in the dictated 

addendum note





Assessment:


Vital Signs/I&O:





                                   Vital Signs








  Date Time  Temp Pulse Resp B/P (MAP) Pulse Ox O2 Delivery O2 Flow Rate FiO2


 


4/15/21 16:12 98.5 71 18 106/67 (80) 95 Room Air  














                                    I & O   


 


 4/14/21 4/14/21 4/15/21





 15:00 23:00 07:00


 


Intake Total 720 ml 480 ml 


 


Balance 720 ml 480 ml 








Labs:





                                Laboratory Tests








Test


 4/15/21


06:17


 


White Blood Count


 8.3 x10^3/uL


(4.0-11.0)


 


Red Blood Count


 4.22 x10^6/uL


(3.50-5.40)


 


Hemoglobin


 13.3 g/dL


(12.0-15.5)


 


Hematocrit


 40.3 %


(36.0-47.0)


 


Mean Corpuscular Volume


 96 fL ()





 


Mean Corpuscular Hemoglobin 32 pg (25-35)  


 


Mean Corpuscular Hemoglobin


Concent 33 g/dL


(31-37)


 


Red Cell Distribution Width


 13.7 %


(11.5-14.5)


 


Platelet Count


 271 x10^3/uL


(140-400)


 


Neutrophils (%) (Auto) 62 % (31-73)  


 


Lymphocytes (%) (Auto) 27 % (24-48)  


 


Monocytes (%) (Auto) 8 % (0-9)  


 


Eosinophils (%) (Auto) 2 % (0-3)  


 


Basophils (%) (Auto) 1 % (0-3)  


 


Neutrophils # (Auto)


 5.2 x10^3uL


(1.8-7.7)


 


Lymphocytes # (Auto)


 2.2 x10^3/uL


(1.0-4.8)


 


Monocytes # (Auto)


 0.7 x10^3/uL


(0.0-1.1)


 


Eosinophils # (Auto)


 0.2 x10^3/uL


(0.0-0.7)


 


Basophils # (Auto)


 0.1 x10^3/uL


(0.0-0.2)


 


Sodium Level


 142 mmol/L


(136-145)


 


Potassium Level


 4.3 mmol/L


(3.5-5.1)


 


Chloride Level


 107 mmol/L


()


 


Carbon Dioxide Level


 23 mmol/L


(21-32)


 


Anion Gap 12 (6-14)  


 


Blood Urea Nitrogen


 23 mg/dL


(7-20)  H


 


Creatinine


 1.0 mg/dL


(0.6-1.0)


 


Estimated GFR


(Cockcroft-Gault) 54.8  





 


BUN/Creatinine Ratio 23 (6-20)  H


 


Glucose Level


 104 mg/dL


(70-99)  H


 


Calcium Level


 9.1 mg/dL


(8.5-10.1)


 


Total Bilirubin


 0.3 mg/dL


(0.2-1.0)


 


Aspartate Amino Transferase


(AST) 15 U/L (15-37)





 


Alanine Aminotransferase (ALT)


 22 U/L (14-59)





 


Alkaline Phosphatase


 100 U/L


()


 


Total Protein


 7.3 g/dL


(6.4-8.2)


 


Albumin


 3.3 g/dL


(3.4-5.0)  L


 


Albumin/Globulin Ratio


 0.8 (1.0-1.7)


L











Current Medications:


Meds:





Laboratory Tests








Test


 4/15/21


06:17


 


White Blood Count 8.3 x10^3/uL 


 


Red Blood Count 4.22 x10^6/uL 


 


Hemoglobin 13.3 g/dL 


 


Hematocrit 40.3 % 


 


Mean Corpuscular Volume 96 fL 


 


Mean Corpuscular Hemoglobin 32 pg 


 


Mean Corpuscular Hemoglobin


Concent 33 g/dL 





 


Red Cell Distribution Width 13.7 % 


 


Platelet Count 271 x10^3/uL 


 


Neutrophils (%) (Auto) 62 % 


 


Lymphocytes (%) (Auto) 27 % 


 


Monocytes (%) (Auto) 8 % 


 


Eosinophils (%) (Auto) 2 % 


 


Basophils (%) (Auto) 1 % 


 


Neutrophils # (Auto) 5.2 x10^3uL 


 


Lymphocytes # (Auto) 2.2 x10^3/uL 


 


Monocytes # (Auto) 0.7 x10^3/uL 


 


Eosinophils # (Auto) 0.2 x10^3/uL 


 


Basophils # (Auto) 0.1 x10^3/uL 


 


Sodium Level 142 mmol/L 


 


Potassium Level 4.3 mmol/L 


 


Chloride Level 107 mmol/L 


 


Carbon Dioxide Level 23 mmol/L 


 


Anion Gap 12 


 


Blood Urea Nitrogen 23 mg/dL 


 


Creatinine 1.0 mg/dL 


 


Estimated GFR


(Cockcroft-Gault) 54.8 





 


BUN/Creatinine Ratio 23 


 


Glucose Level 104 mg/dL 


 


Calcium Level 9.1 mg/dL 


 


Total Bilirubin 0.3 mg/dL 


 


Aspartate Amino Transf


(AST/SGOT) 15 U/L 





 


Alanine Aminotransferase


(ALT/SGPT) 22 U/L 





 


Alkaline Phosphatase 100 U/L 


 


Total Protein 7.3 g/dL 


 


Albumin 3.3 g/dL 


 


Albumin/Globulin Ratio 0.8 








Current Medications








 Medications


  (Trade)  Dose


 Ordered  Sig/Elizabeth


 Route


 PRN Reason  Start Time


 Stop Time Status Last Admin


Dose Admin


 


 Olanzapine


  (ZyPREXA ZYDIS)  2.5 mg  PRN Q2HR  PRN


 PO


 PSYCHOSIS  3/26/21 22:15


    4/1/21 06:54





 


 Trazodone HCl


  (Desyrel)  50 mg  PRN QHS  PRN


 PO


 INSOMNIA  3/26/21 22:15


    4/15/21 20:24





 


 Buspirone HCl


  (Buspar)  5 mg  TID


 PO


   3/27/21 09:00


    4/15/21 20:22





 


 Quetiapine


 Fumarate


  (SEROquel)  50 mg  BID


 PO


   3/27/21 09:00


    4/15/21 20:22





 


 Ascorbic Acid


  (Vitamin C)  500 mg  DAILY


 PO


   3/27/21 09:00


    4/15/21 08:50





 


 Levothyroxine


 Sodium


  (Synthroid)  88 mcg  DAILY06


 PO


   3/27/21 06:00


 3/28/21 12:05 DC 3/27/21 04:40





 


 Multivitamins/


 Calcium


  (Thera-M Plus)  1 tab  DAILY


 PO


   3/27/21 09:00


    4/15/21 08:50





 


 Acetaminophen


  (Tylenol)  650 mg  PRN Q6HRS  PRN


 PO


 MILD PAIN / TEMP > 100.3'F  3/26/21 23:45


     





 


 Multi-Ingredient


 Ointment


  (Analgesic Balm)  1 chon  PRN QID  PRN


 TP


 MUSCLE PAIN  3/26/21 23:45


     





 


 Al Hydroxide/Mg


 Hydroxide


  (Mylanta Plus Xs)  15 ml  PRN AFTMEALHC  PRN


 PO


 DYSPEPSIA  3/26/21 23:45


     





 


 Magnesium


 Hydroxide


  (Milk Of


 Magnesia)  2,400 mg  PRN QHS  PRN


 PO


 CONSTIPATION  3/26/21 23:45


     





 


 Nicotine


  (Nicoderm Cq


 14mg Patch)  1 patch  DAILY


 TD


   3/27/21 09:00


 4/8/21 22:52 DC 4/8/21 08:39





 


 Levothyroxine


 Sodium


  (Synthroid)  50 mcg  DAILY06


 PO


   3/29/21 06:00


    4/15/21 08:49





 


 Amoxicillin


  (Amoxil)  250 mg  [id number]


 PO


   3/29/21 21:00


 4/5/21 21:00 DC 4/5/21 20:13





 


 Clonazepam


  (KlonoPIN)  0.5 mg  BID


 PO


   3/31/21 21:00


 4/3/21 17:33 DC 4/3/21 08:23





 


 Diazepam


  (Valium)  5 mg  1X  ONCE


 PO


   4/1/21 06:00


 4/1/21 06:01 DC 4/1/21 06:00





 


 Lactobacillus


 Rhamnosus


  (Culturelle)  1 cap  BID


 PO


   4/1/21 09:00


    4/15/21 20:23





 


 Nicotine


  (Nicoderm Cq 7mg


 Patch)  1 patch  DAILY


 TD


   4/9/21 09:00


 4/23/21 11:00  4/15/21 08:50





 


 Polyethylene


 Glycol


  (miraLAX)  17 gm  DAILY


 PO


   4/10/21 09:00


    4/15/21 08:50





 


 Sertraline HCl


  (Zoloft)  25 mg  DAILY


 PO


   4/14/21 09:00


 4/16/21 23:50  4/15/21 08:50





 


 Sertraline HCl


  (Zoloft)  50 mg  DAILY


 PO


   4/17/21 09:00


     











I have reviewed the current psychotropics carefully including drug interactions.

 Risk benefit ratio favors no change other than as noted in my dictated progress

note.





Diagnosis:


Problems:  


(1) Bipolar affective disorder, mixed


(2) Anxiety disorder, unspecified


(3) Impulse control disorder, unspecified


(4) Dementia, vascular, with depression


(5) Dementia, vascular, with delusions


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 

disturbance


(9) Major neurocognitive disorder











WILTON JESUS MD                 Apr 15, 2021 22:20

## 2021-04-15 NOTE — PDOC
Exam


Note:


Hayden Note:


This note is a late entry for 04/14/2021 covers elements not covered in my 

initial note.





Subjective:  The patient was seen individually in the evening of 04/14/2021 with

Constantino JUDD, discussed and reviewed the chart.  The patient slept 7 hours previous

night.  Overall the patients ataxia is better.  She did well during the day but

till her niece called and talked to her on the phone and then the patient was 

upset that the niece would not come and discharge her.  I addressed this with 

the patient.  She is quite dismissive.  She does have the abnormal movements.





Review of Systems:  Positive for movement disorder.  No CV, , pulmonary, eye, 

ENT system symptoms on review.  Reliability poor.





Mental Status Exam:  The patient is oriented to herself.  Insight and judgment, 

recent and remote memory, attention and concentration, fund of knowledge is poor

consistent with her diagnoses.





Laboratory Data:  Reviewed.





Impression:  Major neurocognitive disorder Alzheimer vascular with delusion, 

depression, behavioral disturbance.  Anxiety disorder unspecified.  Impulse 

control disorder unspecified.





Plan:  Maintain current psychotropics unchanged.





Assessment:


Vital Signs/I&O:





                                   Vital Signs








  Date Time  Temp Pulse Resp B/P (MAP) Pulse Ox O2 Delivery O2 Flow Rate FiO2


 


4/15/21 06:27 97.2 81 16 122/73 (89) 96   


 


4/11/21 06:37      Room Air  














                                    I & O   


 


 4/14/21 4/14/21 4/15/21





 15:00 23:00 07:00


 


Intake Total 720 ml 480 ml 


 


Balance 720 ml 480 ml 











Current Medications:


Meds:





Current Medications








 Medications


  (Trade)  Dose


 Ordered  Sig/Elizabeth


 Route


 PRN Reason  Start Time


 Stop Time Status Last Admin


Dose Admin


 


 Olanzapine


  (ZyPREXA ZYDIS)  2.5 mg  PRN Q2HR  PRN


 PO


 PSYCHOSIS  3/26/21 22:15


    4/1/21 06:54





 


 Trazodone HCl


  (Desyrel)  50 mg  PRN QHS  PRN


 PO


 INSOMNIA  3/26/21 22:15


    4/12/21 20:03





 


 Buspirone HCl


  (Buspar)  5 mg  TID


 PO


   3/27/21 09:00


    4/14/21 14:35





 


 Quetiapine


 Fumarate


  (SEROquel)  50 mg  BID


 PO


   3/27/21 09:00


    4/14/21 10:18





 


 Ascorbic Acid


  (Vitamin C)  500 mg  DAILY


 PO


   3/27/21 09:00


    4/14/21 10:18





 


 Levothyroxine


 Sodium


  (Synthroid)  88 mcg  DAILY06


 PO


   3/27/21 06:00


 3/28/21 12:05 DC 3/27/21 04:40





 


 Multivitamins/


 Calcium


  (Thera-M Plus)  1 tab  DAILY


 PO


   3/27/21 09:00


    4/14/21 10:18





 


 Acetaminophen


  (Tylenol)  650 mg  PRN Q6HRS  PRN


 PO


 MILD PAIN / TEMP > 100.3'F  3/26/21 23:45


     





 


 Multi-Ingredient


 Ointment


  (Analgesic Balm)  1 chon  PRN QID  PRN


 TP


 MUSCLE PAIN  3/26/21 23:45


     





 


 Al Hydroxide/Mg


 Hydroxide


  (Mylanta Plus Xs)  15 ml  PRN AFTMEALHC  PRN


 PO


 DYSPEPSIA  3/26/21 23:45


     





 


 Magnesium


 Hydroxide


  (Milk Of


 Magnesia)  2,400 mg  PRN QHS  PRN


 PO


 CONSTIPATION  3/26/21 23:45


     





 


 Nicotine


  (Nicoderm Cq


 14mg Patch)  1 patch  DAILY


 TD


   3/27/21 09:00


 4/8/21 22:52 DC 4/8/21 08:39





 


 Levothyroxine


 Sodium


  (Synthroid)  50 mcg  DAILY06


 PO


   3/29/21 06:00


    4/14/21 06:05





 


 Amoxicillin


  (Amoxil)  250 mg  [id number]


 PO


   3/29/21 21:00


 4/5/21 21:00 DC 4/5/21 20:13





 


 Clonazepam


  (KlonoPIN)  0.5 mg  BID


 PO


   3/31/21 21:00


 4/3/21 17:33 DC 4/3/21 08:23





 


 Diazepam


  (Valium)  5 mg  1X  ONCE


 PO


   4/1/21 06:00


 4/1/21 06:01 DC 4/1/21 06:00





 


 Lactobacillus


 Rhamnosus


  (Culturelle)  1 cap  BID


 PO


   4/1/21 09:00


    4/14/21 10:18





 


 Nicotine


  (Nicoderm Cq 7mg


 Patch)  1 patch  DAILY


 TD


   4/9/21 09:00


 4/23/21 11:00  4/14/21 10:18





 


 Polyethylene


 Glycol


  (miraLAX)  17 gm  DAILY


 PO


   4/10/21 09:00


    4/14/21 10:18





 


 Sertraline HCl


  (Zoloft)  25 mg  DAILY


 PO


   4/14/21 09:00


 4/16/21 23:50  4/14/21 10:18





 


 Sertraline HCl


  (Zoloft)  50 mg  DAILY


 PO


   4/17/21 09:00


     











Current Medications








 Medications


  (Trade)  Dose


 Ordered  Sig/Elizabeth


 Route


 PRN Reason  Start Time


 Stop Time Status Last Admin


Dose Admin


 


 Sertraline HCl


  (Zoloft)  25 mg  DAILY


 PO


   4/14/21 09:00


 4/16/21 23:50  4/14/21 10:18











I have reviewed the current psychotropics carefully including drug interactions.

 Risk benefit ratio favors no change other than as noted in my dictated progress

note.





Diagnosis:


Problems:  


(1) Bipolar affective disorder, mixed


(2) Anxiety disorder, unspecified


(3) Impulse control disorder, unspecified


(4) Dementia, vascular, with depression


(5) Dementia, vascular, with delusions


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


(8) Dementia of the Alzheimer's type with early onset with behavioral disturba

nce


(9) Major neurocognitive disorder











WILTON JESUS MD                 Apr 15, 2021 07:09

## 2021-04-15 NOTE — NUR
PATIENT LOCATED IN A DINING ROOM UPON ASSESSMENT. PATIENT IS CALM AND PLEASANT, SMILED, 
COOPERATIVE AND COMPLIANT. PATIENT IS CURRENTLY IN A DAY ROOM PARTICIPATING IN A GROUP AND 
INTERACTING WITH STAFF AND PEERS APPROPRIATELY.

## 2021-04-16 VITALS — DIASTOLIC BLOOD PRESSURE: 60 MMHG | SYSTOLIC BLOOD PRESSURE: 94 MMHG

## 2021-04-16 VITALS — DIASTOLIC BLOOD PRESSURE: 76 MMHG | SYSTOLIC BLOOD PRESSURE: 122 MMHG

## 2021-04-16 RX ADMIN — BUSPIRONE HYDROCHLORIDE SCH MG: 5 TABLET ORAL at 19:51

## 2021-04-16 RX ADMIN — BUSPIRONE HYDROCHLORIDE SCH MG: 5 TABLET ORAL at 13:32

## 2021-04-16 RX ADMIN — QUETIAPINE FUMARATE SCH MG: 50 TABLET, FILM COATED ORAL at 19:51

## 2021-04-16 RX ADMIN — SERTRALINE SCH MG: 25 TABLET, FILM COATED ORAL at 08:30

## 2021-04-16 RX ADMIN — NICOTINE SCH PATCH: 7 PATCH TRANSDERMAL at 08:30

## 2021-04-16 RX ADMIN — LEVOTHYROXINE SODIUM SCH MCG: 50 TABLET ORAL at 05:39

## 2021-04-16 RX ADMIN — Medication SCH CAP: at 19:51

## 2021-04-16 RX ADMIN — POLYETHYLENE GLYCOL 3350 SCH GM: 17 POWDER, FOR SOLUTION ORAL at 09:00

## 2021-04-16 RX ADMIN — BUSPIRONE HYDROCHLORIDE SCH MG: 5 TABLET ORAL at 08:29

## 2021-04-16 RX ADMIN — MULTIPLE VITAMINS W/ MINERALS TAB SCH TAB: TAB at 08:30

## 2021-04-16 RX ADMIN — OXYCODONE HYDROCHLORIDE AND ACETAMINOPHEN SCH MG: 500 TABLET ORAL at 08:29

## 2021-04-16 RX ADMIN — Medication SCH CAP: at 08:29

## 2021-04-16 RX ADMIN — QUETIAPINE FUMARATE SCH MG: 50 TABLET, FILM COATED ORAL at 08:30

## 2021-04-16 NOTE — PDOC
Exam


Note:


Hayden Note:


Please also refer to the separate dictated note~for this date of service 

dictated separately.~Patient seen individually. Discussed the patient with 

Nursing staff reviewed the chart.~Reviewed interim history and current 

functioning. Reviewed vital signs,~Labs/ Radiology~and current medications noted

below. Continue current treatment with the changes noted in the dictated 

addendum note





Assessment:


Vital Signs/I&O:





                                   Vital Signs








  Date Time  Temp Pulse Resp B/P (MAP) Pulse Ox O2 Delivery O2 Flow Rate FiO2


 


4/16/21 16:19 97.5 72 18 94/60 (71) 97 Room Air  














                                    I & O   


 


 4/15/21 4/15/21 4/16/21





 15:00 23:00 07:00


 


Intake Total 460 ml 480 ml 


 


Balance 460 ml 480 ml 











Current Medications:


Meds:





Current Medications








 Medications


  (Trade)  Dose


 Ordered  Sig/Elizabeth


 Route


 PRN Reason  Start Time


 Stop Time Status Last Admin


Dose Admin


 


 Olanzapine


  (ZyPREXA ZYDIS)  2.5 mg  PRN Q2HR  PRN


 PO


 PSYCHOSIS  3/26/21 22:15


    4/1/21 06:54





 


 Trazodone HCl


  (Desyrel)  50 mg  PRN QHS  PRN


 PO


 INSOMNIA  3/26/21 22:15


    4/15/21 20:24





 


 Buspirone HCl


  (Buspar)  5 mg  TID


 PO


   3/27/21 09:00


    4/16/21 19:51





 


 Quetiapine


 Fumarate


  (SEROquel)  50 mg  BID


 PO


   3/27/21 09:00


    4/16/21 19:51





 


 Ascorbic Acid


  (Vitamin C)  500 mg  DAILY


 PO


   3/27/21 09:00


    4/16/21 08:29





 


 Levothyroxine


 Sodium


  (Synthroid)  88 mcg  DAILY06


 PO


   3/27/21 06:00


 3/28/21 12:05 DC 3/27/21 04:40





 


 Multivitamins/


 Calcium


  (Thera-M Plus)  1 tab  DAILY


 PO


   3/27/21 09:00


    4/16/21 08:30





 


 Acetaminophen


  (Tylenol)  650 mg  PRN Q6HRS  PRN


 PO


 MILD PAIN / TEMP > 100.3'F  3/26/21 23:45


     





 


 Multi-Ingredient


 Ointment


  (Analgesic Balm)  1 chon  PRN QID  PRN


 TP


 MUSCLE PAIN  3/26/21 23:45


     





 


 Al Hydroxide/Mg


 Hydroxide


  (Mylanta Plus Xs)  15 ml  PRN AFTMEALHC  PRN


 PO


 DYSPEPSIA  3/26/21 23:45


     





 


 Magnesium


 Hydroxide


  (Milk Of


 Magnesia)  2,400 mg  PRN QHS  PRN


 PO


 CONSTIPATION  3/26/21 23:45


     





 


 Nicotine


  (Nicoderm Cq


 14mg Patch)  1 patch  DAILY


 TD


   3/27/21 09:00


 4/8/21 22:52 DC 4/8/21 08:39





 


 Levothyroxine


 Sodium


  (Synthroid)  50 mcg  DAILY06


 PO


   3/29/21 06:00


    4/16/21 05:39





 


 Amoxicillin


  (Amoxil)  250 mg  [id number]


 PO


   3/29/21 21:00


 4/5/21 21:00 DC 4/5/21 20:13





 


 Clonazepam


  (KlonoPIN)  0.5 mg  BID


 PO


   3/31/21 21:00


 4/3/21 17:33 DC 4/3/21 08:23





 


 Diazepam


  (Valium)  5 mg  1X  ONCE


 PO


   4/1/21 06:00


 4/1/21 06:01 DC 4/1/21 06:00





 


 Lactobacillus


 Rhamnosus


  (Culturelle)  1 cap  BID


 PO


   4/1/21 09:00


    4/16/21 19:51





 


 Nicotine


  (Nicoderm Cq 7mg


 Patch)  1 patch  DAILY


 TD


   4/9/21 09:00


 4/23/21 11:00  4/16/21 08:30





 


 Polyethylene


 Glycol


  (miraLAX)  17 gm  DAILY


 PO


   4/10/21 09:00


    4/15/21 08:50





 


 Sertraline HCl


  (Zoloft)  25 mg  DAILY


 PO


   4/14/21 09:00


 4/16/21 23:50  4/16/21 08:30





 


 Sertraline HCl


  (Zoloft)  50 mg  DAILY


 PO


   4/17/21 09:00


     





 


 Divalproex Sodium


  (Depakote


 Sprinkles)  125 mg  DAILY08


 PO


   4/17/21 08:00


     





 


 Divalproex Sodium


  (Depakote


 Sprinkles)  125 mg  DAILYWSUP


 PO


   4/17/21 17:00


     











I have reviewed the current psychotropics carefully including drug interactions.

 Risk benefit ratio favors no change other than as noted in my dictated progress

note.





Diagnosis:


Problems:  


(1) Bipolar affective disorder, mixed


(2) Anxiety disorder, unspecified


(3) Impulse control disorder, unspecified


(4) Dementia, vascular, with depression


(5) Dementia, vascular, with delusions


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 

disturbance


(9) Major neurocognitive disorder











WILTON JESUS MD                 Apr 16, 2021 22:17

## 2021-04-16 NOTE — PDOC
Exam


Note:


Hayden Note:


This note is a late entry for 04/15/2021 covers elements not covered in my 

initial note.





Subjective:  The patient was seen individually in the evening of 04/15/2021 with

Sarai JUDD, discussed and reviewed the chart.  The patient slept 3-3/4 hours 

previous night.  Overall the patient was somewhat agitated previous night but 

has done better during the day today.  She was yelling at times.  Received 

p.r.n. trazodone, quite friendly this morning and met with her this evening.





Review of Systems:  No CV, , pulmonary, eye, ENT system symptoms on review.  

She does have abnormal movements.





Mental Status Exam:  The patient is oriented to herself.  Insight and judgment, 

recent and remote memory, attention and concentration, fund of knowledge is poor

consistent with her diagnoses.





Laboratory Data:  Reviewed.





Impression:  Major neurocognitive disorder Alzheimer vascular with delusion, 

depression, behavioral disturbance.  Anxiety disorder unspecified.  Impulse 

control disorder unspecified.





Plan:  Maintain current psychotropics unchanged.  For now the patient is fairly 

stable, but if mood lability worsens we may need to increase the daytime 

scheduled Seroquel.  Maintain Zoloft, BuSpar at current dosage.





Assessment:


Vital Signs/I&O:





                                   Vital Signs








  Date Time  Temp Pulse Resp B/P (MAP) Pulse Ox O2 Delivery O2 Flow Rate FiO2


 


4/16/21 05:53 97.7 69 18 122/76 (91) 97   


 


4/15/21 16:12      Room Air  














                                    I & O   


 


 4/15/21 4/15/21 4/16/21





 15:00 23:00 07:00


 


Intake Total 460 ml 480 ml 


 


Balance 460 ml 480 ml 











Current Medications:


Meds:





Current Medications








 Medications


  (Trade)  Dose


 Ordered  Sig/Elizabeth


 Route


 PRN Reason  Start Time


 Stop Time Status Last Admin


Dose Admin


 


 Olanzapine


  (ZyPREXA ZYDIS)  2.5 mg  PRN Q2HR  PRN


 PO


 PSYCHOSIS  3/26/21 22:15


    4/1/21 06:54





 


 Trazodone HCl


  (Desyrel)  50 mg  PRN QHS  PRN


 PO


 INSOMNIA  3/26/21 22:15


    4/15/21 20:24





 


 Buspirone HCl


  (Buspar)  5 mg  TID


 PO


   3/27/21 09:00


    4/15/21 20:22





 


 Quetiapine


 Fumarate


  (SEROquel)  50 mg  BID


 PO


   3/27/21 09:00


    4/15/21 20:22





 


 Ascorbic Acid


  (Vitamin C)  500 mg  DAILY


 PO


   3/27/21 09:00


    4/15/21 08:50





 


 Levothyroxine


 Sodium


  (Synthroid)  88 mcg  DAILY06


 PO


   3/27/21 06:00


 3/28/21 12:05 DC 3/27/21 04:40





 


 Multivitamins/


 Calcium


  (Thera-M Plus)  1 tab  DAILY


 PO


   3/27/21 09:00


    4/15/21 08:50





 


 Acetaminophen


  (Tylenol)  650 mg  PRN Q6HRS  PRN


 PO


 MILD PAIN / TEMP > 100.3'F  3/26/21 23:45


     





 


 Multi-Ingredient


 Ointment


  (Analgesic Balm)  1 chon  PRN QID  PRN


 TP


 MUSCLE PAIN  3/26/21 23:45


     





 


 Al Hydroxide/Mg


 Hydroxide


  (Mylanta Plus Xs)  15 ml  PRN AFTMEALHC  PRN


 PO


 DYSPEPSIA  3/26/21 23:45


     





 


 Magnesium


 Hydroxide


  (Milk Of


 Magnesia)  2,400 mg  PRN QHS  PRN


 PO


 CONSTIPATION  3/26/21 23:45


     





 


 Nicotine


  (Nicoderm Cq


 14mg Patch)  1 patch  DAILY


 TD


   3/27/21 09:00


 4/8/21 22:52 DC 4/8/21 08:39





 


 Levothyroxine


 Sodium


  (Synthroid)  50 mcg  DAILY06


 PO


   3/29/21 06:00


    4/16/21 05:39





 


 Amoxicillin


  (Amoxil)  250 mg  [id number]


 PO


   3/29/21 21:00


 4/5/21 21:00 DC 4/5/21 20:13





 


 Clonazepam


  (KlonoPIN)  0.5 mg  BID


 PO


   3/31/21 21:00


 4/3/21 17:33 DC 4/3/21 08:23





 


 Diazepam


  (Valium)  5 mg  1X  ONCE


 PO


   4/1/21 06:00


 4/1/21 06:01 DC 4/1/21 06:00





 


 Lactobacillus


 Rhamnosus


  (Culturelle)  1 cap  BID


 PO


   4/1/21 09:00


    4/15/21 20:23





 


 Nicotine


  (Nicoderm Cq 7mg


 Patch)  1 patch  DAILY


 TD


   4/9/21 09:00


 4/23/21 11:00  4/15/21 08:50





 


 Polyethylene


 Glycol


  (miraLAX)  17 gm  DAILY


 PO


   4/10/21 09:00


    4/15/21 08:50





 


 Sertraline HCl


  (Zoloft)  25 mg  DAILY


 PO


   4/14/21 09:00


 4/16/21 23:50  4/15/21 08:50





 


 Sertraline HCl


  (Zoloft)  50 mg  DAILY


 PO


   4/17/21 09:00


     











I have reviewed the current psychotropics carefully including drug interactions.

 Risk benefit ratio favors no change other than as noted in my dictated progress

note.





Diagnosis:


Problems:  


(1) Bipolar affective disorder, mixed


(2) Anxiety disorder, unspecified


(3) Impulse control disorder, unspecified


(4) Dementia, vascular, with depression


(5) Dementia, vascular, with delusions


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 

disturbance


(9) Major neurocognitive disorder











WILTON JESUS MD                 Apr 16, 2021 07:01

## 2021-04-16 NOTE — NUR
PATIENT LOCATED IN A DINING ROOM UPON ASSESSMENT, REFUSED TO COOPERATE WHEN APPROACHED FOR 
ASSESSMENT AND MED ADMINISTRATION, REFUSED TAKING MEDICATIONS, SWINGING WITH HER ARM, SAYING 
" OK BUY NOW" . PATIENT WAS APPROACHED BY THIS RN LATER AFTER BREAKFAST FOR THE SECOND 
ATTEMPT TO ADMINISTER MEDICATIONS. PATIENT TOOK MEDS WITH ENCOURAGEMENT. PATIENT OFFERED TO 
TAKE A SHOWER, PATIENT REFUSED TO TAKE ONE THIS TIME. PATIENT IS CURRENTLY IN A DAY ROOM 
ATTENDING THE GROUP.

## 2021-04-17 VITALS — DIASTOLIC BLOOD PRESSURE: 63 MMHG | SYSTOLIC BLOOD PRESSURE: 113 MMHG

## 2021-04-17 VITALS — SYSTOLIC BLOOD PRESSURE: 100 MMHG | DIASTOLIC BLOOD PRESSURE: 68 MMHG

## 2021-04-17 RX ADMIN — BUSPIRONE HYDROCHLORIDE SCH MG: 5 TABLET ORAL at 19:20

## 2021-04-17 RX ADMIN — BUSPIRONE HYDROCHLORIDE SCH MG: 5 TABLET ORAL at 08:55

## 2021-04-17 RX ADMIN — Medication SCH CAP: at 19:20

## 2021-04-17 RX ADMIN — NICOTINE SCH PATCH: 7 PATCH TRANSDERMAL at 08:55

## 2021-04-17 RX ADMIN — POLYETHYLENE GLYCOL 3350 SCH GM: 17 POWDER, FOR SOLUTION ORAL at 08:55

## 2021-04-17 RX ADMIN — DIVALPROEX SODIUM SCH MG: 125 CAPSULE, COATED PELLETS ORAL at 16:59

## 2021-04-17 RX ADMIN — LEVOTHYROXINE SODIUM SCH MCG: 50 TABLET ORAL at 06:03

## 2021-04-17 RX ADMIN — QUETIAPINE FUMARATE SCH MG: 50 TABLET, FILM COATED ORAL at 08:55

## 2021-04-17 RX ADMIN — QUETIAPINE FUMARATE SCH MG: 50 TABLET, FILM COATED ORAL at 19:20

## 2021-04-17 RX ADMIN — MULTIPLE VITAMINS W/ MINERALS TAB SCH TAB: TAB at 08:55

## 2021-04-17 RX ADMIN — DIVALPROEX SODIUM SCH MG: 125 CAPSULE, COATED PELLETS ORAL at 08:55

## 2021-04-17 RX ADMIN — Medication SCH CAP: at 08:55

## 2021-04-17 RX ADMIN — SERTRALINE HYDROCHLORIDE SCH MG: 50 TABLET ORAL at 08:55

## 2021-04-17 RX ADMIN — OXYCODONE HYDROCHLORIDE AND ACETAMINOPHEN SCH MG: 500 TABLET ORAL at 08:55

## 2021-04-17 RX ADMIN — BUSPIRONE HYDROCHLORIDE SCH MG: 5 TABLET ORAL at 14:08

## 2021-04-17 NOTE — NUR
Shift summary



Patient very cooperative today. Very pleasant with cares. Took medications without 
difficulty. Patient has been in day room and dining room throughout [facility].

## 2021-04-17 NOTE — PDOC
Exam


Note:


Hayden Note:


Please also refer to the separate dictated note~for this date of service 

dictated separately.~Patient seen individually. Discussed the patient with 

Nursing staff reviewed the chart.~Reviewed interim history and current 

functioning. Reviewed vital signs,~Labs/ Radiology~and current medications noted

below. Continue current treatment with the changes noted in the dictated 

addendum note





Assessment:


Vital Signs/I&O:





                                   Vital Signs








  Date Time  Temp Pulse Resp B/P (MAP) Pulse Ox O2 Delivery O2 Flow Rate FiO2


 


4/17/21 16:21 97.7 74 18 100/68 (79) 94 Room Air  














                                    I & O   


 


 4/16/21 4/16/21 4/17/21





 14:59 22:59 06:59


 


Intake Total 840 ml 480 ml 


 


Balance 840 ml 480 ml 











Current Medications:


Meds:





Current Medications








 Medications


  (Trade)  Dose


 Ordered  Sig/Elizabeth


 Route


 PRN Reason  Start Time


 Stop Time Status Last Admin


Dose Admin


 


 Olanzapine


  (ZyPREXA ZYDIS)  2.5 mg  PRN Q2HR  PRN


 PO


 PSYCHOSIS  3/26/21 22:15


    4/1/21 06:54





 


 Trazodone HCl


  (Desyrel)  50 mg  PRN QHS  PRN


 PO


 INSOMNIA  3/26/21 22:15


    4/15/21 20:24





 


 Buspirone HCl


  (Buspar)  5 mg  TID


 PO


   3/27/21 09:00


    4/17/21 19:20





 


 Quetiapine


 Fumarate


  (SEROquel)  50 mg  BID


 PO


   3/27/21 09:00


    4/17/21 19:20





 


 Ascorbic Acid


  (Vitamin C)  500 mg  DAILY


 PO


   3/27/21 09:00


    4/17/21 08:55





 


 Levothyroxine


 Sodium


  (Synthroid)  88 mcg  DAILY06


 PO


   3/27/21 06:00


 3/28/21 12:05 DC 3/27/21 04:40





 


 Multivitamins/


 Calcium


  (Thera-M Plus)  1 tab  DAILY


 PO


   3/27/21 09:00


    4/17/21 08:55





 


 Acetaminophen


  (Tylenol)  650 mg  PRN Q6HRS  PRN


 PO


 MILD PAIN / TEMP > 100.3'F  3/26/21 23:45


     





 


 Multi-Ingredient


 Ointment


  (Analgesic Balm)  1 chon  PRN QID  PRN


 TP


 MUSCLE PAIN  3/26/21 23:45


     





 


 Al Hydroxide/Mg


 Hydroxide


  (Mylanta Plus Xs)  15 ml  PRN AFTMEALHC  PRN


 PO


 DYSPEPSIA  3/26/21 23:45


     





 


 Magnesium


 Hydroxide


  (Milk Of


 Magnesia)  2,400 mg  PRN QHS  PRN


 PO


 CONSTIPATION  3/26/21 23:45


     





 


 Nicotine


  (Nicoderm Cq


 14mg Patch)  1 patch  DAILY


 TD


   3/27/21 09:00


 4/8/21 22:52 DC 4/8/21 08:39





 


 Levothyroxine


 Sodium


  (Synthroid)  50 mcg  DAILY06


 PO


   3/29/21 06:00


    4/17/21 06:03





 


 Amoxicillin


  (Amoxil)  250 mg  [id number]


 PO


   3/29/21 21:00


 4/5/21 21:00 DC 4/5/21 20:13





 


 Clonazepam


  (KlonoPIN)  0.5 mg  BID


 PO


   3/31/21 21:00


 4/3/21 17:33 DC 4/3/21 08:23





 


 Diazepam


  (Valium)  5 mg  1X  ONCE


 PO


   4/1/21 06:00


 4/1/21 06:01 DC 4/1/21 06:00





 


 Lactobacillus


 Rhamnosus


  (Culturelle)  1 cap  BID


 PO


   4/1/21 09:00


    4/17/21 19:20





 


 Nicotine


  (Nicoderm Cq 7mg


 Patch)  1 patch  DAILY


 TD


   4/9/21 09:00


 4/23/21 11:00  4/17/21 08:55





 


 Polyethylene


 Glycol


  (miraLAX)  17 gm  DAILY


 PO


   4/10/21 09:00


    4/17/21 08:55





 


 Sertraline HCl


  (Zoloft)  25 mg  DAILY


 PO


   4/14/21 09:00


 4/16/21 23:50 DC 4/16/21 08:30





 


 Sertraline HCl


  (Zoloft)  50 mg  DAILY


 PO


   4/17/21 09:00


    4/17/21 08:55





 


 Divalproex Sodium


  (Depakote


 Sprinkles)  125 mg  DAILY08


 PO


   4/17/21 08:00


    4/17/21 08:55





 


 Divalproex Sodium


  (Depakote


 Sprinkles)  125 mg  DAILYWSUP


 PO


   4/17/21 17:00


    4/17/21 16:59











Current Medications








 Medications


  (Trade)  Dose


 Ordered  Sig/Elizabeth


 Route


 PRN Reason  Start Time


 Stop Time Status Last Admin


Dose Admin


 


 Sertraline HCl


  (Zoloft)  50 mg  DAILY


 PO


   4/17/21 09:00


    4/17/21 08:55





 


 Divalproex Sodium


  (Depakote


 Sprinkles)  125 mg  DAILY08


 PO


   4/17/21 08:00


    4/17/21 08:55





 


 Divalproex Sodium


  (Depakote


 Sprinkles)  125 mg  DAILYWSUP


 PO


   4/17/21 17:00


    4/17/21 16:59











I have reviewed the current psychotropics carefully including drug interactions.

 Risk benefit ratio favors no change other than as noted in my dictated progress

note.





Diagnosis:


Problems:  


(1) Bipolar affective disorder, mixed


(2) Anxiety disorder, unspecified


(3) Impulse control disorder, unspecified


(4) Dementia, vascular, with depression


(5) Dementia, vascular, with delusions


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

isturbance


(9) Major neurocognitive disorder











WILTON JESUS MD                 Apr 17, 2021 22:04

## 2021-04-17 NOTE — NUR
Last evening pt walked in he hallway or sat quietly in the day room. She took HS meds whole 
without difficulty. She has been pleasantly confused and cooperative with cares and has had 
no behaviors tonight.

## 2021-04-17 NOTE — PDOC
Exam


Note:


Hayden Note:


This note is a late entry for 04/16/2021 covers elements not covered in my 

initial note.





Subjective:  The patient was seen individually in the evening of 04/16/2021 with

Sarai JUDD, discussed and reviewed the chart.  The patient slept 7-3/4 hours 

previous night.  She gets quite agitated with showers, refused that today.  She 

continues to have mood lability, anxious, forgetful.





Review of Systems:  No CV, , pulmonary, eye, ENT system symptoms on review.  

She does have abnormal movements.





Mental Status Exam:  The patient is oriented to herself.  Insight and judgment, 

recent and remote memory, attention and concentration, fund of knowledge is poor

consistent with her diagnoses.





Laboratory Data:  Reviewed.





Impression:  Major neurocognitive disorder Alzheimer vascular with delusion, 

depression, behavioral disturbance.  Anxiety disorder unspecified.  Impulse 

control disorder unspecified.





Plan:  Maintain current psychotropics unchanged.  Given the patients ongoing 

mood lability, we will start Depakote Sprinkle 125 mg 9 a.m. and 5 p.m.  Check 

CBC, CMP, valproic acid level in days.  Continue rest psychotropics unchanged.





Assessment:


Vital Signs/I&O:





                                   Vital Signs








  Date Time  Temp Pulse Resp B/P (MAP) Pulse Ox O2 Delivery O2 Flow Rate FiO2


 


4/17/21 06:48 97.2 71 16 113/63 (80) 94 Room Air  














                                    I & O   


 


 4/16/21 4/16/21 4/17/21





 15:00 23:00 07:00


 


Intake Total 840 ml 480 ml 


 


Balance 840 ml 480 ml 











Current Medications:


Meds:





Current Medications








 Medications


  (Trade)  Dose


 Ordered  Sig/Elizabeth


 Route


 PRN Reason  Start Time


 Stop Time Status Last Admin


Dose Admin


 


 Olanzapine


  (ZyPREXA ZYDIS)  2.5 mg  PRN Q2HR  PRN


 PO


 PSYCHOSIS  3/26/21 22:15


    4/1/21 06:54





 


 Trazodone HCl


  (Desyrel)  50 mg  PRN QHS  PRN


 PO


 INSOMNIA  3/26/21 22:15


    4/15/21 20:24





 


 Buspirone HCl


  (Buspar)  5 mg  TID


 PO


   3/27/21 09:00


    4/16/21 19:51





 


 Quetiapine


 Fumarate


  (SEROquel)  50 mg  BID


 PO


   3/27/21 09:00


    4/16/21 19:51





 


 Ascorbic Acid


  (Vitamin C)  500 mg  DAILY


 PO


   3/27/21 09:00


    4/16/21 08:29





 


 Levothyroxine


 Sodium


  (Synthroid)  88 mcg  DAILY06


 PO


   3/27/21 06:00


 3/28/21 12:05 DC 3/27/21 04:40





 


 Multivitamins/


 Calcium


  (Thera-M Plus)  1 tab  DAILY


 PO


   3/27/21 09:00


    4/16/21 08:30





 


 Acetaminophen


  (Tylenol)  650 mg  PRN Q6HRS  PRN


 PO


 MILD PAIN / TEMP > 100.3'F  3/26/21 23:45


     





 


 Multi-Ingredient


 Ointment


  (Analgesic Balm)  1 chon  PRN QID  PRN


 TP


 MUSCLE PAIN  3/26/21 23:45


     





 


 Al Hydroxide/Mg


 Hydroxide


  (Mylanta Plus Xs)  15 ml  PRN AFTMEALHC  PRN


 PO


 DYSPEPSIA  3/26/21 23:45


     





 


 Magnesium


 Hydroxide


  (Milk Of


 Magnesia)  2,400 mg  PRN QHS  PRN


 PO


 CONSTIPATION  3/26/21 23:45


     





 


 Nicotine


  (Nicoderm Cq


 14mg Patch)  1 patch  DAILY


 TD


   3/27/21 09:00


 4/8/21 22:52 DC 4/8/21 08:39





 


 Levothyroxine


 Sodium


  (Synthroid)  50 mcg  DAILY06


 PO


   3/29/21 06:00


    4/17/21 06:03





 


 Amoxicillin


  (Amoxil)  250 mg  [id number]


 PO


   3/29/21 21:00


 4/5/21 21:00 DC 4/5/21 20:13





 


 Clonazepam


  (KlonoPIN)  0.5 mg  BID


 PO


   3/31/21 21:00


 4/3/21 17:33 DC 4/3/21 08:23





 


 Diazepam


  (Valium)  5 mg  1X  ONCE


 PO


   4/1/21 06:00


 4/1/21 06:01 DC 4/1/21 06:00





 


 Lactobacillus


 Rhamnosus


  (Culturelle)  1 cap  BID


 PO


   4/1/21 09:00


    4/16/21 19:51





 


 Nicotine


  (Nicoderm Cq 7mg


 Patch)  1 patch  DAILY


 TD


   4/9/21 09:00


 4/23/21 11:00  4/16/21 08:30





 


 Polyethylene


 Glycol


  (miraLAX)  17 gm  DAILY


 PO


   4/10/21 09:00


    4/15/21 08:50





 


 Sertraline HCl


  (Zoloft)  25 mg  DAILY


 PO


   4/14/21 09:00


 4/16/21 23:50 DC 4/16/21 08:30





 


 Sertraline HCl


  (Zoloft)  50 mg  DAILY


 PO


   4/17/21 09:00


     





 


 Divalproex Sodium


  (Depakote


 Sprinkles)  125 mg  DAILY08


 PO


   4/17/21 08:00


     





 


 Divalproex Sodium


  (Depakote


 Sprinkles)  125 mg  DAILYWSUP


 PO


   4/17/21 17:00


     











I have reviewed the current psychotropics carefully including drug interactions.

 Risk benefit ratio favors no change other than as noted in my dictated progress

note.





Diagnosis:


Problems:  


(1) Bipolar affective disorder, mixed


(2) Anxiety disorder, unspecified


(3) Impulse control disorder, unspecified


(4) Dementia, vascular, with depression


(5) Dementia, vascular, with delusions


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 

disturbance


(9) Major neurocognitive disorder











WILTON JESUS MD                 Apr 17, 2021 07:39

## 2021-04-18 VITALS — SYSTOLIC BLOOD PRESSURE: 98 MMHG | DIASTOLIC BLOOD PRESSURE: 66 MMHG

## 2021-04-18 RX ADMIN — BUSPIRONE HYDROCHLORIDE SCH MG: 5 TABLET ORAL at 09:06

## 2021-04-18 RX ADMIN — MULTIPLE VITAMINS W/ MINERALS TAB SCH TAB: TAB at 09:06

## 2021-04-18 RX ADMIN — BUSPIRONE HYDROCHLORIDE SCH MG: 5 TABLET ORAL at 13:06

## 2021-04-18 RX ADMIN — DIVALPROEX SODIUM SCH MG: 125 CAPSULE, COATED PELLETS ORAL at 09:06

## 2021-04-18 RX ADMIN — NICOTINE SCH PATCH: 7 PATCH TRANSDERMAL at 09:06

## 2021-04-18 RX ADMIN — Medication SCH CAP: at 09:06

## 2021-04-18 RX ADMIN — OXYCODONE HYDROCHLORIDE AND ACETAMINOPHEN SCH MG: 500 TABLET ORAL at 09:06

## 2021-04-18 RX ADMIN — SERTRALINE HYDROCHLORIDE SCH MG: 50 TABLET ORAL at 09:06

## 2021-04-18 RX ADMIN — QUETIAPINE FUMARATE SCH MG: 50 TABLET, FILM COATED ORAL at 20:05

## 2021-04-18 RX ADMIN — BUSPIRONE HYDROCHLORIDE SCH MG: 5 TABLET ORAL at 20:05

## 2021-04-18 RX ADMIN — QUETIAPINE FUMARATE SCH MG: 50 TABLET, FILM COATED ORAL at 09:06

## 2021-04-18 RX ADMIN — Medication SCH CAP: at 20:05

## 2021-04-18 RX ADMIN — LEVOTHYROXINE SODIUM SCH MCG: 50 TABLET ORAL at 05:56

## 2021-04-18 RX ADMIN — POLYETHYLENE GLYCOL 3350 SCH GM: 17 POWDER, FOR SOLUTION ORAL at 09:07

## 2021-04-18 RX ADMIN — DIVALPROEX SODIUM SCH MG: 125 CAPSULE, COATED PELLETS ORAL at 16:16

## 2021-04-18 NOTE — NUR
Last evening pt was in day room she was pleasant and interactive with staff. Meds were taken 
whole without difficulty. She has had no behaviors tonight.

## 2021-04-18 NOTE — PDOC
Exam


Note:


Hayden Note:


This note is a late entry for 04/17/2021 covers elements not covered in my 

initial note.





Subjective:  The patient was seen individually in the evening of 04/17/2021 with

Sheldon JUDD, discussed and reviewed the chart.  The patient slept 6-3/4 hours 

previous night.  She has been doing reasonably well, resistive with cares, 

compliant with medications.





Review of Systems:  No CV, , pulmonary, eye, ENT system symptoms on review.





Mental Status Exam:  The patient is oriented to herself.  Insight and judgment, 

recent and remote memory, attention and concentration, fund of knowledge is poor

consistent with her diagnoses.





Laboratory Data:  Reviewed.





Impression:  Major neurocognitive disorder Alzheimer vascular with delusion, 

depression, behavioral disturbance.  Anxiety disorder unspecified.  Impulse 

control disorder unspecified.





Plan:  Maintain current psychotropics unchanged.





Assessment:


Vital Signs/I&O:





                                   Vital Signs








  Date Time  Temp Pulse Resp B/P (MAP) Pulse Ox O2 Delivery O2 Flow Rate FiO2


 


4/18/21 06:52   20     


 


4/17/21 16:21 97.7 74  100/68 (79) 94 Room Air  














                                    I & O   


 


 4/17/21 4/17/21 4/18/21





 15:00 23:00 07:00


 


Intake Total 600 ml 600 ml 


 


Balance 600 ml 600 ml 











Current Medications:


Meds:





Current Medications








 Medications


  (Trade)  Dose


 Ordered  Sig/Elizabeth


 Route


 PRN Reason  Start Time


 Stop Time Status Last Admin


Dose Admin


 


 Olanzapine


  (ZyPREXA ZYDIS)  2.5 mg  PRN Q2HR  PRN


 PO


 PSYCHOSIS  3/26/21 22:15


    4/1/21 06:54





 


 Trazodone HCl


  (Desyrel)  50 mg  PRN QHS  PRN


 PO


 INSOMNIA  3/26/21 22:15


    4/15/21 20:24





 


 Buspirone HCl


  (Buspar)  5 mg  TID


 PO


   3/27/21 09:00


    4/17/21 19:20





 


 Quetiapine


 Fumarate


  (SEROquel)  50 mg  BID


 PO


   3/27/21 09:00


    4/17/21 19:20





 


 Ascorbic Acid


  (Vitamin C)  500 mg  DAILY


 PO


   3/27/21 09:00


    4/17/21 08:55





 


 Levothyroxine


 Sodium


  (Synthroid)  88 mcg  DAILY06


 PO


   3/27/21 06:00


 3/28/21 12:05 DC 3/27/21 04:40





 


 Multivitamins/


 Calcium


  (Thera-M Plus)  1 tab  DAILY


 PO


   3/27/21 09:00


    4/17/21 08:55





 


 Acetaminophen


  (Tylenol)  650 mg  PRN Q6HRS  PRN


 PO


 MILD PAIN / TEMP > 100.3'F  3/26/21 23:45


     





 


 Multi-Ingredient


 Ointment


  (Analgesic Balm)  1 chon  PRN QID  PRN


 TP


 MUSCLE PAIN  3/26/21 23:45


     





 


 Al Hydroxide/Mg


 Hydroxide


  (Mylanta Plus Xs)  15 ml  PRN AFTMEALHC  PRN


 PO


 DYSPEPSIA  3/26/21 23:45


     





 


 Magnesium


 Hydroxide


  (Milk Of


 Magnesia)  2,400 mg  PRN QHS  PRN


 PO


 CONSTIPATION  3/26/21 23:45


     





 


 Nicotine


  (Nicoderm Cq


 14mg Patch)  1 patch  DAILY


 TD


   3/27/21 09:00


 4/8/21 22:52 DC 4/8/21 08:39





 


 Levothyroxine


 Sodium


  (Synthroid)  50 mcg  DAILY06


 PO


   3/29/21 06:00


    4/18/21 05:56





 


 Amoxicillin


  (Amoxil)  250 mg  [id number]


 PO


   3/29/21 21:00


 4/5/21 21:00 DC 4/5/21 20:13





 


 Clonazepam


  (KlonoPIN)  0.5 mg  BID


 PO


   3/31/21 21:00


 4/3/21 17:33 DC 4/3/21 08:23





 


 Diazepam


  (Valium)  5 mg  1X  ONCE


 PO


   4/1/21 06:00


 4/1/21 06:01 DC 4/1/21 06:00





 


 Lactobacillus


 Rhamnosus


  (Culturelle)  1 cap  BID


 PO


   4/1/21 09:00


    4/17/21 19:20





 


 Nicotine


  (Nicoderm Cq 7mg


 Patch)  1 patch  DAILY


 TD


   4/9/21 09:00


 4/23/21 11:00  4/17/21 08:55





 


 Polyethylene


 Glycol


  (miraLAX)  17 gm  DAILY


 PO


   4/10/21 09:00


    4/17/21 08:55





 


 Sertraline HCl


  (Zoloft)  25 mg  DAILY


 PO


   4/14/21 09:00


 4/16/21 23:50 DC 4/16/21 08:30





 


 Sertraline HCl


  (Zoloft)  50 mg  DAILY


 PO


   4/17/21 09:00


    4/17/21 08:55





 


 Divalproex Sodium


  (Depakote


 Sprinkles)  125 mg  DAILY08


 PO


   4/17/21 08:00


    4/17/21 08:55





 


 Divalproex Sodium


  (Depakote


 Sprinkles)  125 mg  DAILYWSUP


 PO


   4/17/21 17:00


    4/17/21 16:59











Current Medications








 Medications


  (Trade)  Dose


 Ordered  Sig/Elizabeth


 Route


 PRN Reason  Start Time


 Stop Time Status Last Admin


Dose Admin


 


 Sertraline HCl


  (Zoloft)  50 mg  DAILY


 PO


   4/17/21 09:00


    4/17/21 08:55





 


 Divalproex Sodium


  (Depakote


 Sprinkles)  125 mg  DAILY08


 PO


   4/17/21 08:00


    4/17/21 08:55





 


 Divalproex Sodium


  (Depakote


 Sprinkles)  125 mg  DAILYWSUP


 PO


   4/17/21 17:00


    4/17/21 16:59











I have reviewed the current psychotropics carefully including drug interactions.

 Risk benefit ratio favors no change other than as noted in my dictated progress

note.





Diagnosis:


Problems:  


(1) Bipolar affective disorder, mixed


(2) Anxiety disorder, unspecified


(3) Impulse control disorder, unspecified


(4) Dementia, vascular, with depression


(5) Dementia, vascular, with delusions


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 

disturbance


(9) Major neurocognitive disorder











WILTON JESUS MD                 Apr 18, 2021 07:50

## 2021-04-18 NOTE — PDOC
Exam


Note:


Hayden Note:


Please also refer to the separate dictated note~for this date of service 

dictated separately.~Patient seen individually. Discussed the patient with 

Nursing staff reviewed the chart.~Reviewed interim history and current 

functioning. Reviewed vital signs,~Labs/ Radiology~and current medications noted

below. Continue current treatment with the changes noted in the dictated 

addendum note





Assessment:


Vital Signs/I&O:





                                   Vital Signs








  Date Time  Temp Pulse Resp B/P (MAP) Pulse Ox O2 Delivery O2 Flow Rate FiO2


 


4/18/21 16:12 97.8 74 17 98/66 (77) 97   


 


4/17/21 16:21      Room Air  














                                    I & O   


 


 4/17/21 4/17/21 4/18/21





 15:00 23:00 07:00


 


Intake Total 600 ml 600 ml 


 


Balance 600 ml 600 ml 











Current Medications:


Meds:





Current Medications








 Medications


  (Trade)  Dose


 Ordered  Sig/Elizabeth


 Route


 PRN Reason  Start Time


 Stop Time Status Last Admin


Dose Admin


 


 Olanzapine


  (ZyPREXA ZYDIS)  2.5 mg  PRN Q2HR  PRN


 PO


 PSYCHOSIS  3/26/21 22:15


    4/1/21 06:54





 


 Trazodone HCl


  (Desyrel)  50 mg  PRN QHS  PRN


 PO


 INSOMNIA  3/26/21 22:15


    4/15/21 20:24





 


 Buspirone HCl


  (Buspar)  5 mg  TID


 PO


   3/27/21 09:00


    4/18/21 20:05





 


 Quetiapine


 Fumarate


  (SEROquel)  50 mg  BID


 PO


   3/27/21 09:00


    4/18/21 20:05





 


 Ascorbic Acid


  (Vitamin C)  500 mg  DAILY


 PO


   3/27/21 09:00


    4/18/21 09:06





 


 Levothyroxine


 Sodium


  (Synthroid)  88 mcg  DAILY06


 PO


   3/27/21 06:00


 3/28/21 12:05 DC 3/27/21 04:40





 


 Multivitamins/


 Calcium


  (Thera-M Plus)  1 tab  DAILY


 PO


   3/27/21 09:00


    4/18/21 09:06





 


 Acetaminophen


  (Tylenol)  650 mg  PRN Q6HRS  PRN


 PO


 MILD PAIN / TEMP > 100.3'F  3/26/21 23:45


     





 


 Multi-Ingredient


 Ointment


  (Analgesic Balm)  1 chon  PRN QID  PRN


 TP


 MUSCLE PAIN  3/26/21 23:45


     





 


 Al Hydroxide/Mg


 Hydroxide


  (Mylanta Plus Xs)  15 ml  PRN AFTMEALHC  PRN


 PO


 DYSPEPSIA  3/26/21 23:45


     





 


 Magnesium


 Hydroxide


  (Milk Of


 Magnesia)  2,400 mg  PRN QHS  PRN


 PO


 CONSTIPATION  3/26/21 23:45


     





 


 Nicotine


  (Nicoderm Cq


 14mg Patch)  1 patch  DAILY


 TD


   3/27/21 09:00


 4/8/21 22:52 DC 4/8/21 08:39





 


 Levothyroxine


 Sodium


  (Synthroid)  50 mcg  DAILY06


 PO


   3/29/21 06:00


    4/18/21 05:56





 


 Amoxicillin


  (Amoxil)  250 mg  [id number]


 PO


   3/29/21 21:00


 4/5/21 21:00 DC 4/5/21 20:13





 


 Clonazepam


  (KlonoPIN)  0.5 mg  BID


 PO


   3/31/21 21:00


 4/3/21 17:33 DC 4/3/21 08:23





 


 Diazepam


  (Valium)  5 mg  1X  ONCE


 PO


   4/1/21 06:00


 4/1/21 06:01 DC 4/1/21 06:00





 


 Lactobacillus


 Rhamnosus


  (Culturelle)  1 cap  BID


 PO


   4/1/21 09:00


    4/18/21 20:05





 


 Nicotine


  (Nicoderm Cq 7mg


 Patch)  1 patch  DAILY


 TD


   4/9/21 09:00


 4/23/21 11:00  4/18/21 09:06





 


 Polyethylene


 Glycol


  (miraLAX)  17 gm  DAILY


 PO


   4/10/21 09:00


    4/18/21 09:07





 


 Sertraline HCl


  (Zoloft)  25 mg  DAILY


 PO


   4/14/21 09:00


 4/16/21 23:50 DC 4/16/21 08:30





 


 Sertraline HCl


  (Zoloft)  50 mg  DAILY


 PO


   4/17/21 09:00


    4/18/21 09:06





 


 Divalproex Sodium


  (Depakote


 Sprinkles)  125 mg  DAILY08


 PO


   4/17/21 08:00


    4/18/21 09:06





 


 Divalproex Sodium


  (Depakote


 Sprinkles)  125 mg  DAILYWSUP


 PO


   4/17/21 17:00


    4/18/21 16:16











I have reviewed the current psychotropics carefully including drug interactions.

 Risk benefit ratio favors no change other than as noted in my dictated progress

note.





Diagnosis:


Problems:  


(1) Bipolar affective disorder, mixed


(2) Anxiety disorder, unspecified


(3) Impulse control disorder, unspecified


(4) Dementia, vascular, with depression


(5) Dementia, vascular, with delusions


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 

disturbance


(9) Major neurocognitive disorder











WILTON JESUS MD                 Apr 18, 2021 22:10

## 2021-04-19 VITALS — SYSTOLIC BLOOD PRESSURE: 126 MMHG | DIASTOLIC BLOOD PRESSURE: 78 MMHG

## 2021-04-19 VITALS — SYSTOLIC BLOOD PRESSURE: 113 MMHG | DIASTOLIC BLOOD PRESSURE: 56 MMHG

## 2021-04-19 LAB
ALBUMIN SERPL-MCNC: 3.2 G/DL (ref 3.4–5)
ALBUMIN/GLOB SERPL: 0.8 {RATIO} (ref 1–1.7)
ALP SERPL-CCNC: 96 U/L (ref 46–116)
ALT SERPL-CCNC: 19 U/L (ref 14–59)
ANION GAP SERPL CALC-SCNC: 5 MMOL/L (ref 6–14)
AST SERPL-CCNC: 13 U/L (ref 15–37)
BILIRUB SERPL-MCNC: 0.3 MG/DL (ref 0.2–1)
BUN/CREAT SERPL: 23 (ref 6–20)
CA-I SERPL ISE-MCNC: 23 MG/DL (ref 7–20)
CALCIUM SERPL-MCNC: 9.3 MG/DL (ref 8.5–10.1)
CHLORIDE SERPL-SCNC: 106 MMOL/L (ref 98–107)
CO2 SERPL-SCNC: 30 MMOL/L (ref 21–32)
CREAT SERPL-MCNC: 1 MG/DL (ref 0.6–1)
ERYTHROCYTE [DISTWIDTH] IN BLOOD BY AUTOMATED COUNT: 13.9 % (ref 11.5–14.5)
GFR SERPLBLD BASED ON 1.73 SQ M-ARVRAT: 54.8 ML/MIN
GLOBULIN SER-MCNC: 4 G/DL (ref 2.2–3.8)
GLUCOSE SERPL-MCNC: 91 MG/DL (ref 70–99)
HCT VFR BLD CALC: 39.5 % (ref 36–47)
HGB BLD-MCNC: 13.2 G/DL (ref 12–15.5)
MCH RBC QN AUTO: 32 PG (ref 25–35)
MCHC RBC AUTO-ENTMCNC: 33 G/DL (ref 31–37)
MCV RBC AUTO: 96 FL (ref 79–100)
PLATELET # BLD AUTO: 256 X10^3/UL (ref 140–400)
POTASSIUM SERPL-SCNC: 4.5 MMOL/L (ref 3.5–5.1)
PROT SERPL-MCNC: 7.2 G/DL (ref 6.4–8.2)
RBC # BLD AUTO: 4.1 X10^6/UL (ref 3.5–5.4)
SODIUM SERPL-SCNC: 141 MMOL/L (ref 136–145)
VAL ACID: 19 MCG/ML (ref 50–100)
WBC # BLD AUTO: 7.9 X10^3/UL (ref 4–11)

## 2021-04-19 RX ADMIN — BUSPIRONE HYDROCHLORIDE SCH MG: 5 TABLET ORAL at 14:01

## 2021-04-19 RX ADMIN — POLYETHYLENE GLYCOL 3350 SCH GM: 17 POWDER, FOR SOLUTION ORAL at 10:45

## 2021-04-19 RX ADMIN — BUSPIRONE HYDROCHLORIDE SCH MG: 5 TABLET ORAL at 10:45

## 2021-04-19 RX ADMIN — LEVOTHYROXINE SODIUM SCH MCG: 50 TABLET ORAL at 05:47

## 2021-04-19 RX ADMIN — MULTIPLE VITAMINS W/ MINERALS TAB SCH TAB: TAB at 10:46

## 2021-04-19 RX ADMIN — DIVALPROEX SODIUM SCH MG: 125 CAPSULE, COATED PELLETS ORAL at 10:45

## 2021-04-19 RX ADMIN — OXYCODONE HYDROCHLORIDE AND ACETAMINOPHEN SCH MG: 500 TABLET ORAL at 10:45

## 2021-04-19 RX ADMIN — DIVALPROEX SODIUM SCH MG: 125 CAPSULE, COATED PELLETS ORAL at 17:48

## 2021-04-19 RX ADMIN — BUSPIRONE HYDROCHLORIDE SCH MG: 5 TABLET ORAL at 20:22

## 2021-04-19 RX ADMIN — Medication SCH CAP: at 10:46

## 2021-04-19 RX ADMIN — NICOTINE SCH PATCH: 7 PATCH TRANSDERMAL at 10:45

## 2021-04-19 RX ADMIN — Medication SCH CAP: at 20:22

## 2021-04-19 RX ADMIN — QUETIAPINE FUMARATE SCH MG: 50 TABLET, FILM COATED ORAL at 20:22

## 2021-04-19 RX ADMIN — QUETIAPINE FUMARATE SCH MG: 50 TABLET, FILM COATED ORAL at 10:46

## 2021-04-19 RX ADMIN — SERTRALINE HYDROCHLORIDE SCH MG: 50 TABLET ORAL at 10:46

## 2021-04-19 NOTE — NUR
WEEKLY ACTIVITY THERAPY NOTE

Date of Admission: 3/26/21

Date of AT Assessment: 3/29

Precipitating behaviors that initiated intake and admission: pt belligerent, refusing 
cares/showers, becoming combative and swinging at staff, agitated, anxious, verbally 
abusive, and name calling.

Goal aimed:increase socialization and engagement

Initial Goal:Pt will participate in at least one individual or group Activity Therapy 
session before discharge.

Goal changed 4/5: Pt will participate in at least three individual or group Activity Therapy 
session per week.

Weekly progress towards goal: achieved, 3/3

Group participation level: 1 min, 2 mod

Weekly highlights: sang delta tristin during Karaoke group Tuesday afternoon

Behaviors observed: quiet but pleasant during group sessions

Plan: repeat goal

Beneficial adaptations:

## 2021-04-19 NOTE — TX PLAN
Interdisciplinary Tx Plan


Admission Information


Mar 26, 2021 at 21:37


Legal Status (on Admission):  Voluntary


DPOA/Guardian Name:  Marylou Smith


Contact Phone Number:  [phone]


Other Contact Name:  Alva


Other Contact Phone:  [phone]


Verified Code Status:  DNR


Allergies:  


Coded Allergies:  


     codeine (Verified  Allergy, Unknown, Unknown, 3/26/21)


     propoxyphene (Verified  Allergy, Unknown, Unknown, 3/26/21)


     tetracycline (Verified  Allergy, Unknown, Unknown, 3/26/21)





Diagnoses


Primary Diagnosis:  


1.  Bipolar disorder, mixed.


2.  Dementia, mild unspecified.


3.  Generalized anxiety disorder.


Reasons for Admission:  Aggressive, Agitated, Angry, Anxiety/Panic, Combative


Problem in Patient's Words:  


The behaviors that pt is currently demonstrating  


are the behaviors she was having a few years ago  


when she was placed in her facility. At that  


time, she probably hadn't bathed in nine monthes  


as she was living in an apaprtment and not caring  


for herself.


Additional Admission Comments:  


Per intake pt, was belligerent, refusing  


cares/showers, incontinent, combative, swinging  


at staff, agitated, anxious, verbally abusive,  


name calling, and staff from her facility would  


distance themseleves from her because of her  


behaviors.





Problems


Active Problems:  


Agitation, angry, withdrawn, uncooperative


Inactive Problems:  


None noted at this time.





Pt Strengths/Limitations


Ability for [place]:  Poor


Cognitive Functioning/Ability:  Poor


Communication Skills/Ability:  Poor


Financial Resources:  Poor


Insight/Judgement:  Poor


Intellectual Ability:  Poor


Physical Health:  Fair


Social Skills:  Poor


Stability in Family:  Fair


Stability in School/Work:  Poor


Verbal Skills:  Fair





Discharge Criteria


Discharge Criteria:  No need for close observ., Adequate arrangements @DC, V

erbal commit med comply, Improved mood/thought


Other Discharge Comments:  


None at this time.





Preliminary Discharge Plan


Preliminary DC Plan:  Current Living Arrange.





Special Precautions


Special Precautions:  Agitation/Assault


Fall Risk:  Moderate





Initial D/C Plan





Pt plan is to return to [facility].


Identified Discharge Needs:  


None noted at this time.





Currently Utilized Resources


Currently Utilized Resources/P:  


PCP is Dr. White


Psychiatrist is Dr. Pino


DPOA is kendal Marylou Sarah


Facility is [facility] Community Resources:  


None noted at this time.





Identified Problems/Hx/Goals


Objectives/Short-Term Goals


Short Term Goals:  Control abnormal behavior, Dec. Aggression, Dec. 

Anxiety/Panic, Dec. Outbursts, Dec. Symp. Depression, Medication Stabilization, 

Monitor Med Effects, Promote Coping Skill


Short Term Goals in Patient's:  


Medication eval and adjusments to help with controlling abnormal


behaviors. Help pt to feel better and be less agitated and more compliant


with cares.





Interventions/Frequency


Staff Interventions/Frequency&:  


Psychiatry to assess pt three times per week for medication management.


Nursing to assess behaviors, monitor medications, and complete 15 minute


checks daily.


Social work to see pt at least two times weekly to aid in return to


placement.


Activities to encourage pt to participate in group activities daily.





History


Vocational History:  


Pt was always a . According to pt  


niece/Marylou HEREDIA. Pt would not necessarily  


work when she was  as her husbands would  


provide for the home. 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


Education:  


Pt quit school her matilde year as she became  


pregnant at 16 y.o.





Community Follow-up





PCP


Psychiatry


Community Provider/Family Inpu:  


Marylou HEREDIA, aware of pt hospitalization and provided information on


social history. Marylou available as needed for further information.





Treatment Plan Explained


Patient/Representative had this treatment plan explained to him/her as indicated

by the signature below and


has been given the opportunity to ask questions and make suggestions:











Date:  





Patient/Representative Signature: _____________________________________________





Status Update


Update


Pt continues to average eating 75% of meals and sleeps 7.25 hours. Pt appears to

be more interactive and in pleasant spirits. She has attended some groups and 

enjoys music and time on the patio. Over the past couple of days, pt appears to 

be more cooperative with cares. She is medication compliant. YAN and facility 

are aware of hydrocephalus and will make decisions regarding treatment following

pt hospitalization here at [facility]. Pt will return to [facility] upon time of d/c.











DAVID GOLDBERG                Apr 19, 2021 14:07

## 2021-04-19 NOTE — NUR
Nursing Note

Pt in day room has significant upper extremity movements but are improved over the recent 
past.  Seems to be able to hold a glass and drink without spilling and smiles states she had 
a good day.  No behaviors.

## 2021-04-19 NOTE — NUR
Nursing note:

Pt was allowed to sleep in this AM. Pt was given her AM meds when she awoke around 1045. She 
is pleasant, med compliant and cooperative. Pt denies having any pain or complaints at time 
of assessment. She is currently sitting in the day room watching TV. Will continue to 
monitor.

## 2021-04-19 NOTE — PDOC
Exam


Note:


Hayden Note:


This note is a late entry for 04/18/2021 covers elements not covered in my 

initial note.





Subjective:  The patient was seen individually in the evening of 04/18/2021 with

Sarai JUDD, discussed and reviewed the chart.  The patient slept 6 hours previous 

night.  She is compliant with her medications.  No disruptive behaviors noted.





Review of Systems:  No CV, , pulmonary, eye, ENT system symptoms on review.





Mental Status Exam:  The patient is oriented to herself.  Insight and judgment, 

recent and remote memory, attention and concentration, fund of knowledge is poor

consistent with her diagnoses.





Laboratory Data:  Reviewed.





Impression:  Major neurocognitive disorder Alzheimer vascular with delusion, 

depression, behavioral disturbance.  Anxiety disorder unspecified.  Impulse 

control disorder unspecified.





Plan:  Maintain current psychotropics unchanged.  Continue BuSpar, Seroquel, 

Zyprexa p.r.n., trazodone p.r.n., Zoloft and Depakote Sprinkle.  Adjust further 

as clinically indicated.





Assessment:


Vital Signs/I&O:





                                   Vital Signs








  Date Time  Temp Pulse Resp B/P (MAP) Pulse Ox O2 Delivery O2 Flow Rate FiO2


 


4/19/21 06:40 97.0 67 17 126/78 (94) 96   


 


4/17/21 16:21      Room Air  














                                    I & O   


 


 4/18/21 4/18/21 4/19/21





 14:59 22:59 06:59


 


Intake Total  600 ml 


 


Balance  600 ml 








Labs:





                                Laboratory Tests








Test


 4/19/21


06:00


 


White Blood Count


 7.9 x10^3/uL


(4.0-11.0)


 


Red Blood Count


 4.10 x10^6/uL


(3.50-5.40)


 


Hemoglobin


 13.2 g/dL


(12.0-15.5)


 


Hematocrit


 39.5 %


(36.0-47.0)


 


Mean Corpuscular Volume


 96 fL ()





 


Mean Corpuscular Hemoglobin 32 pg (25-35)  


 


Mean Corpuscular Hemoglobin


Concent 33 g/dL


(31-37)


 


Red Cell Distribution Width


 13.9 %


(11.5-14.5)


 


Platelet Count


 256 x10^3/uL


(140-400)


 


Sodium Level


 141 mmol/L


(136-145)


 


Potassium Level


 4.5 mmol/L


(3.5-5.1)


 


Chloride Level


 106 mmol/L


()


 


Carbon Dioxide Level


 30 mmol/L


(21-32)


 


Anion Gap 5 (6-14)  L


 


Blood Urea Nitrogen


 23 mg/dL


(7-20)  H


 


Creatinine


 1.0 mg/dL


(0.6-1.0)


 


Estimated GFR


(Cockcroft-Gault) 54.8  





 


BUN/Creatinine Ratio 23 (6-20)  H


 


Glucose Level


 91 mg/dL


(70-99)


 


Calcium Level


 9.3 mg/dL


(8.5-10.1)


 


Total Bilirubin


 0.3 mg/dL


(0.2-1.0)


 


Aspartate Amino Transferase


(AST) 13 U/L (15-37)


L


 


Alanine Aminotransferase (ALT)


 19 U/L (14-59)





 


Alkaline Phosphatase


 96 U/L


()


 


Total Protein


 7.2 g/dL


(6.4-8.2)


 


Albumin


 3.2 g/dL


(3.4-5.0)  L


 


Albumin/Globulin Ratio


 0.8 (1.0-1.7)


L


 


Valproic Acid Level


 19 mcg/mL


()  L


 


Valproic Acid Last Dose Date 04/17/2021  


 


Valproic Acid Last Dose Time 0800  











Current Medications:


Meds:





Laboratory Tests








Test


 4/19/21


06:00


 


White Blood Count 7.9 x10^3/uL 


 


Red Blood Count 4.10 x10^6/uL 


 


Hemoglobin 13.2 g/dL 


 


Hematocrit 39.5 % 


 


Mean Corpuscular Volume 96 fL 


 


Mean Corpuscular Hemoglobin 32 pg 


 


Mean Corpuscular Hemoglobin


Concent 33 g/dL 





 


Red Cell Distribution Width 13.9 % 


 


Platelet Count 256 x10^3/uL 


 


Sodium Level 141 mmol/L 


 


Potassium Level 4.5 mmol/L 


 


Chloride Level 106 mmol/L 


 


Carbon Dioxide Level 30 mmol/L 


 


Anion Gap 5 


 


Blood Urea Nitrogen 23 mg/dL 


 


Creatinine 1.0 mg/dL 


 


Estimated GFR


(Cockcroft-Gault) 54.8 





 


BUN/Creatinine Ratio 23 


 


Glucose Level 91 mg/dL 


 


Calcium Level 9.3 mg/dL 


 


Total Bilirubin 0.3 mg/dL 


 


Aspartate Amino Transf


(AST/SGOT) 13 U/L 





 


Alanine Aminotransferase


(ALT/SGPT) 19 U/L 





 


Alkaline Phosphatase 96 U/L 


 


Total Protein 7.2 g/dL 


 


Albumin 3.2 g/dL 


 


Albumin/Globulin Ratio 0.8 


 


Valproic Acid (Depakene) Level 19 mcg/mL 


 


Valproic Acid Last Dose Date 04/17/2021 


 


Valproic Acid Last Dose Time 0800 








Current Medications








 Medications


  (Trade)  Dose


 Ordered  Sig/Elizabeth


 Route


 PRN Reason  Start Time


 Stop Time Status Last Admin


Dose Admin


 


 Olanzapine


  (ZyPREXA ZYDIS)  2.5 mg  PRN Q2HR  PRN


 PO


 PSYCHOSIS  3/26/21 22:15


    4/1/21 06:54





 


 Trazodone HCl


  (Desyrel)  50 mg  PRN QHS  PRN


 PO


 INSOMNIA  3/26/21 22:15


    4/15/21 20:24





 


 Buspirone HCl


  (Buspar)  5 mg  TID


 PO


   3/27/21 09:00


    4/18/21 20:05





 


 Quetiapine


 Fumarate


  (SEROquel)  50 mg  BID


 PO


   3/27/21 09:00


    4/18/21 20:05





 


 Ascorbic Acid


  (Vitamin C)  500 mg  DAILY


 PO


   3/27/21 09:00


    4/18/21 09:06





 


 Levothyroxine


 Sodium


  (Synthroid)  88 mcg  DAILY06


 PO


   3/27/21 06:00


 3/28/21 12:05 DC 3/27/21 04:40





 


 Multivitamins/


 Calcium


  (Thera-M Plus)  1 tab  DAILY


 PO


   3/27/21 09:00


    4/18/21 09:06





 


 Acetaminophen


  (Tylenol)  650 mg  PRN Q6HRS  PRN


 PO


 MILD PAIN / TEMP > 100.3'F  3/26/21 23:45


     





 


 Multi-Ingredient


 Ointment


  (Analgesic Balm)  1 chon  PRN QID  PRN


 TP


 MUSCLE PAIN  3/26/21 23:45


     





 


 Al Hydroxide/Mg


 Hydroxide


  (Mylanta Plus Xs)  15 ml  PRN AFTMEALHC  PRN


 PO


 DYSPEPSIA  3/26/21 23:45


     





 


 Magnesium


 Hydroxide


  (Milk Of


 Magnesia)  2,400 mg  PRN QHS  PRN


 PO


 CONSTIPATION  3/26/21 23:45


     





 


 Nicotine


  (Nicoderm Cq


 14mg Patch)  1 patch  DAILY


 TD


   3/27/21 09:00


 4/8/21 22:52 DC 4/8/21 08:39





 


 Levothyroxine


 Sodium


  (Synthroid)  50 mcg  DAILY06


 PO


   3/29/21 06:00


    4/19/21 05:47





 


 Amoxicillin


  (Amoxil)  250 mg  [id number]


 PO


   3/29/21 21:00


 4/5/21 21:00 DC 4/5/21 20:13





 


 Clonazepam


  (KlonoPIN)  0.5 mg  BID


 PO


   3/31/21 21:00


 4/3/21 17:33 DC 4/3/21 08:23





 


 Diazepam


  (Valium)  5 mg  1X  ONCE


 PO


   4/1/21 06:00


 4/1/21 06:01 DC 4/1/21 06:00





 


 Lactobacillus


 Rhamnosus


  (Culturelle)  1 cap  BID


 PO


   4/1/21 09:00


    4/18/21 20:05





 


 Nicotine


  (Nicoderm Cq 7mg


 Patch)  1 patch  DAILY


 TD


   4/9/21 09:00


 4/23/21 11:00  4/18/21 09:06





 


 Polyethylene


 Glycol


  (miraLAX)  17 gm  DAILY


 PO


   4/10/21 09:00


    4/18/21 09:07





 


 Sertraline HCl


  (Zoloft)  25 mg  DAILY


 PO


   4/14/21 09:00


 4/16/21 23:50 DC 4/16/21 08:30





 


 Sertraline HCl


  (Zoloft)  50 mg  DAILY


 PO


   4/17/21 09:00


    4/18/21 09:06





 


 Divalproex Sodium


  (Depakote


 Sprinkles)  125 mg  DAILY08


 PO


   4/17/21 08:00


    4/18/21 09:06





 


 Divalproex Sodium


  (Depakote


 Sprinkles)  125 mg  DAILYWSUP


 PO


   4/17/21 17:00


    4/18/21 16:16











I have reviewed the current psychotropics carefully including drug interactions.

 Risk benefit ratio favors no change other than as noted in my dictated progress

note.





Diagnosis:


Problems:  


(1) Bipolar affective disorder, mixed


(2) Anxiety disorder, unspecified


(3) Impulse control disorder, unspecified


(4) Dementia, vascular, with depression


(5) Dementia, vascular, with delusions


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 

disturbance


(9) Major neurocognitive disorder











WILTON JESUS MD                 Apr 19, 2021 06:44

## 2021-04-19 NOTE — NUR
Last evening pt sat in the day room and was pleasant and social. She seemed to enjoy 
interactinng with others. She took meds whole without difficulty and has had no behaviors 
tonight.

## 2021-04-20 VITALS — SYSTOLIC BLOOD PRESSURE: 130 MMHG | DIASTOLIC BLOOD PRESSURE: 85 MMHG

## 2021-04-20 VITALS — SYSTOLIC BLOOD PRESSURE: 100 MMHG | DIASTOLIC BLOOD PRESSURE: 66 MMHG

## 2021-04-20 RX ADMIN — LEVOTHYROXINE SODIUM SCH MCG: 50 TABLET ORAL at 05:55

## 2021-04-20 RX ADMIN — DIVALPROEX SODIUM SCH MG: 125 CAPSULE, COATED PELLETS ORAL at 09:11

## 2021-04-20 RX ADMIN — BUSPIRONE HYDROCHLORIDE SCH MG: 5 TABLET ORAL at 09:11

## 2021-04-20 RX ADMIN — QUETIAPINE FUMARATE SCH MG: 50 TABLET, FILM COATED ORAL at 09:11

## 2021-04-20 RX ADMIN — BUSPIRONE HYDROCHLORIDE SCH MG: 5 TABLET ORAL at 20:21

## 2021-04-20 RX ADMIN — MULTIPLE VITAMINS W/ MINERALS TAB SCH TAB: TAB at 09:11

## 2021-04-20 RX ADMIN — Medication SCH CAP: at 20:21

## 2021-04-20 RX ADMIN — NICOTINE SCH PATCH: 7 PATCH TRANSDERMAL at 09:11

## 2021-04-20 RX ADMIN — OXYCODONE HYDROCHLORIDE AND ACETAMINOPHEN SCH MG: 500 TABLET ORAL at 09:11

## 2021-04-20 RX ADMIN — DIVALPROEX SODIUM SCH MG: 125 CAPSULE, COATED PELLETS ORAL at 17:41

## 2021-04-20 RX ADMIN — QUETIAPINE FUMARATE SCH MG: 50 TABLET, FILM COATED ORAL at 20:21

## 2021-04-20 RX ADMIN — BUSPIRONE HYDROCHLORIDE SCH MG: 5 TABLET ORAL at 14:19

## 2021-04-20 RX ADMIN — Medication SCH CAP: at 09:11

## 2021-04-20 RX ADMIN — SERTRALINE HYDROCHLORIDE SCH MG: 50 TABLET ORAL at 09:11

## 2021-04-20 RX ADMIN — POLYETHYLENE GLYCOL 3350 SCH GM: 17 POWDER, FOR SOLUTION ORAL at 09:11

## 2021-04-20 NOTE — PDOC
Exam


Note:


Hyaden Note:


Please also refer to the separate dictated note~for this date of service 

dictated separately.~Patient seen individually. Discussed the patient with 

Nursing staff reviewed the chart.~Reviewed interim history and current 

functioning. Reviewed vital signs,~Labs/ Radiology~and current medications noted

below. Continue current treatment with the changes noted in the dictated 

addendum note





Assessment:


Vital Signs/I&O:





                                   Vital Signs








  Date Time  Temp Pulse Resp B/P (MAP) Pulse Ox O2 Delivery O2 Flow Rate FiO2


 


4/20/21 16:15 98.6 70 18 100/66 (77) 95   


 


4/17/21 16:21      Room Air  














                                    I & O   


 


 4/19/21 4/19/21 4/20/21





 15:00 23:00 07:00


 


Intake Total 480 ml 480 ml 240 ml


 


Balance 480 ml 480 ml 240 ml











Current Medications:


Meds:





Current Medications








 Medications


  (Trade)  Dose


 Ordered  Sig/Elizabeth


 Route


 PRN Reason  Start Time


 Stop Time Status Last Admin


Dose Admin


 


 Olanzapine


  (ZyPREXA ZYDIS)  2.5 mg  PRN Q2HR  PRN


 PO


 PSYCHOSIS  3/26/21 22:15


    4/1/21 06:54





 


 Trazodone HCl


  (Desyrel)  50 mg  PRN QHS  PRN


 PO


 INSOMNIA  3/26/21 22:15


    4/15/21 20:24





 


 Buspirone HCl


  (Buspar)  5 mg  TID


 PO


   3/27/21 09:00


    4/20/21 20:21





 


 Quetiapine


 Fumarate


  (SEROquel)  50 mg  BID


 PO


   3/27/21 09:00


    4/20/21 20:21





 


 Ascorbic Acid


  (Vitamin C)  500 mg  DAILY


 PO


   3/27/21 09:00


    4/20/21 09:11





 


 Levothyroxine


 Sodium


  (Synthroid)  88 mcg  DAILY06


 PO


   3/27/21 06:00


 3/28/21 12:05 DC 3/27/21 04:40





 


 Multivitamins/


 Calcium


  (Thera-M Plus)  1 tab  DAILY


 PO


   3/27/21 09:00


    4/20/21 09:11





 


 Acetaminophen


  (Tylenol)  650 mg  PRN Q6HRS  PRN


 PO


 MILD PAIN / TEMP > 100.3'F  3/26/21 23:45


     





 


 Multi-Ingredient


 Ointment


  (Analgesic Balm)  1 chon  PRN QID  PRN


 TP


 MUSCLE PAIN  3/26/21 23:45


     





 


 Al Hydroxide/Mg


 Hydroxide


  (Mylanta Plus Xs)  15 ml  PRN AFTMEALHC  PRN


 PO


 DYSPEPSIA  3/26/21 23:45


     





 


 Magnesium


 Hydroxide


  (Milk Of


 Magnesia)  2,400 mg  PRN QHS  PRN


 PO


 CONSTIPATION  3/26/21 23:45


     





 


 Nicotine


  (Nicoderm Cq


 14mg Patch)  1 patch  DAILY


 TD


   3/27/21 09:00


 4/8/21 22:52 DC 4/8/21 08:39





 


 Levothyroxine


 Sodium


  (Synthroid)  50 mcg  DAILY06


 PO


   3/29/21 06:00


    4/20/21 05:55





 


 Amoxicillin


  (Amoxil)  250 mg  [id number]


 PO


   3/29/21 21:00


 4/5/21 21:00 DC 4/5/21 20:13





 


 Clonazepam


  (KlonoPIN)  0.5 mg  BID


 PO


   3/31/21 21:00


 4/3/21 17:33 DC 4/3/21 08:23





 


 Diazepam


  (Valium)  5 mg  1X  ONCE


 PO


   4/1/21 06:00


 4/1/21 06:01 DC 4/1/21 06:00





 


 Lactobacillus


 Rhamnosus


  (Culturelle)  1 cap  BID


 PO


   4/1/21 09:00


    4/20/21 20:21





 


 Nicotine


  (Nicoderm Cq 7mg


 Patch)  1 patch  DAILY


 TD


   4/9/21 09:00


 4/23/21 11:00  4/20/21 09:11





 


 Polyethylene


 Glycol


  (miraLAX)  17 gm  DAILY


 PO


   4/10/21 09:00


    4/20/21 09:11





 


 Sertraline HCl


  (Zoloft)  25 mg  DAILY


 PO


   4/14/21 09:00


 4/16/21 23:50 DC 4/16/21 08:30





 


 Sertraline HCl


  (Zoloft)  50 mg  DAILY


 PO


   4/17/21 09:00


    4/20/21 09:11





 


 Divalproex Sodium


  (Depakote


 Sprinkles)  125 mg  DAILY08


 PO


   4/17/21 08:00


    4/20/21 09:11





 


 Divalproex Sodium


  (Depakote


 Sprinkles)  125 mg  DAILYWSUP


 PO


   4/17/21 17:00


    4/20/21 17:41











I have reviewed the current psychotropics carefully including drug interactions.

 Risk benefit ratio favors no change other than as noted in my dictated progress

note.





Diagnosis:


Problems:  


(1) Bipolar affective disorder, mixed


(2) Anxiety disorder, unspecified


(3) Impulse control disorder, unspecified


(4) Dementia, vascular, with depression


(5) Dementia, vascular, with delusions


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 

disturbance


(9) Major neurocognitive disorder











WILTON JESUS MD                 Apr 20, 2021 23:16

## 2021-04-20 NOTE — NUR
Nursing note:

Pt has been pleasant, med compliant and cooperative this shift. Pt denies having any 
pain/concerns. No behaviors noted this shift. She is currently in the dining room for 
supper. Will continue to monitor.

## 2021-04-20 NOTE — PDOC
Exam


Note:


Hayden Note:





This note is a late entry for 04/19/2021 covers elements not covered in my 

initial note.





Subjective:  The patient was reviewed in the morning of 04/19/2021 for a 

treatment team meeting with Margarita Hsu, Bhavani Christensen and Shahnaz (social 

services), Kiarra, activity therapy and Sharla JUDD, discussed and reviewed the 

chart.  The patient slept 6-1/2 hours previous night.  Appetite is 75%.  Overall

the patient is doing much better.  She seems to have a sense of humor.  

Nevertheless has significant memory impairment.





Review of Systems:  No CV, , pulmonary, eye, ENT system symptoms on review.  

She does have the abnormal movements.





Mental Status Exam:  The patient is oriented to herself and situation.  Insight 

and judgment, recent and remote memory, attention and concentration, fund of 

knowledge is poor consistent with her diagnoses.





Laboratory Data:  Reviewed.





Impression:  Major neurocognitive disorder Alzheimer vascular with delusion, 

depression, behavioral disturbance.  Anxiety disorder unspecified.  Impulse 

control disorder unspecified.





Plan:  Maintain current psychotropics unchanged.





Assessment:


Vital Signs/I&O:





                                   Vital Signs








  Date Time  Temp Pulse Resp B/P (MAP) Pulse Ox O2 Delivery O2 Flow Rate FiO2


 


4/20/21 06:14 97.1 69 20 130/85 (100)    


 


4/19/21 15:55     95   


 


4/17/21 16:21      Room Air  














                                    I & O   


 


 4/19/21 4/19/21 4/20/21





 15:00 23:00 07:00


 


Intake Total 480 ml 480 ml 240 ml


 


Balance 480 ml 480 ml 240 ml











Current Medications:


Meds:





Current Medications








 Medications


  (Trade)  Dose


 Ordered  Sig/Elizabeth


 Route


 PRN Reason  Start Time


 Stop Time Status Last Admin


Dose Admin


 


 Olanzapine


  (ZyPREXA ZYDIS)  2.5 mg  PRN Q2HR  PRN


 PO


 PSYCHOSIS  3/26/21 22:15


    4/1/21 06:54





 


 Trazodone HCl


  (Desyrel)  50 mg  PRN QHS  PRN


 PO


 INSOMNIA  3/26/21 22:15


    4/15/21 20:24





 


 Buspirone HCl


  (Buspar)  5 mg  TID


 PO


   3/27/21 09:00


    4/19/21 20:22





 


 Quetiapine


 Fumarate


  (SEROquel)  50 mg  BID


 PO


   3/27/21 09:00


    4/19/21 20:22





 


 Ascorbic Acid


  (Vitamin C)  500 mg  DAILY


 PO


   3/27/21 09:00


    4/19/21 10:45





 


 Levothyroxine


 Sodium


  (Synthroid)  88 mcg  DAILY06


 PO


   3/27/21 06:00


 3/28/21 12:05 DC 3/27/21 04:40





 


 Multivitamins/


 Calcium


  (Thera-M Plus)  1 tab  DAILY


 PO


   3/27/21 09:00


    4/19/21 10:46





 


 Acetaminophen


  (Tylenol)  650 mg  PRN Q6HRS  PRN


 PO


 MILD PAIN / TEMP > 100.3'F  3/26/21 23:45


     





 


 Multi-Ingredient


 Ointment


  (Analgesic Balm)  1 chon  PRN QID  PRN


 TP


 MUSCLE PAIN  3/26/21 23:45


     





 


 Al Hydroxide/Mg


 Hydroxide


  (Mylanta Plus Xs)  15 ml  PRN AFTMEALHC  PRN


 PO


 DYSPEPSIA  3/26/21 23:45


     





 


 Magnesium


 Hydroxide


  (Milk Of


 Magnesia)  2,400 mg  PRN QHS  PRN


 PO


 CONSTIPATION  3/26/21 23:45


     





 


 Nicotine


  (Nicoderm Cq


 14mg Patch)  1 patch  DAILY


 TD


   3/27/21 09:00


 4/8/21 22:52 DC 4/8/21 08:39





 


 Levothyroxine


 Sodium


  (Synthroid)  50 mcg  DAILY06


 PO


   3/29/21 06:00


    4/20/21 05:55





 


 Amoxicillin


  (Amoxil)  250 mg  [id number]


 PO


   3/29/21 21:00


 4/5/21 21:00 DC 4/5/21 20:13





 


 Clonazepam


  (KlonoPIN)  0.5 mg  BID


 PO


   3/31/21 21:00


 4/3/21 17:33 DC 4/3/21 08:23





 


 Diazepam


  (Valium)  5 mg  1X  ONCE


 PO


   4/1/21 06:00


 4/1/21 06:01 DC 4/1/21 06:00





 


 Lactobacillus


 Rhamnosus


  (Culturelle)  1 cap  BID


 PO


   4/1/21 09:00


    4/19/21 20:22





 


 Nicotine


  (Nicoderm Cq 7mg


 Patch)  1 patch  DAILY


 TD


   4/9/21 09:00


 4/23/21 11:00  4/19/21 10:45





 


 Polyethylene


 Glycol


  (miraLAX)  17 gm  DAILY


 PO


   4/10/21 09:00


    4/19/21 10:45





 


 Sertraline HCl


  (Zoloft)  25 mg  DAILY


 PO


   4/14/21 09:00


 4/16/21 23:50 DC 4/16/21 08:30





 


 Sertraline HCl


  (Zoloft)  50 mg  DAILY


 PO


   4/17/21 09:00


    4/19/21 10:46





 


 Divalproex Sodium


  (Depakote


 Sprinkles)  125 mg  DAILY08


 PO


   4/17/21 08:00


    4/19/21 10:45





 


 Divalproex Sodium


  (Depakote


 Sprinkles)  125 mg  DAILYWSUP


 PO


   4/17/21 17:00


    4/19/21 17:48











I have reviewed the current psychotropics carefully including drug interactions.

 Risk benefit ratio favors no change other than as noted in my dictated progress

note.





Diagnosis:


Problems:  


(1) Bipolar affective disorder, mixed


(2) Anxiety disorder, unspecified


(3) Impulse control disorder, unspecified


(4) Dementia, vascular, with depression


(5) Dementia, vascular, with delusions


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 

disturbance


(9) Major neurocognitive disorder











WILTON JESUS MD                 Apr 20, 2021 07:00

## 2021-04-20 NOTE — NUR
ROB left message with pt facility. Awaiting return call. Will call back tomorrow if no 
returned call by end of day.

## 2021-04-21 VITALS — DIASTOLIC BLOOD PRESSURE: 59 MMHG | SYSTOLIC BLOOD PRESSURE: 106 MMHG

## 2021-04-21 VITALS — SYSTOLIC BLOOD PRESSURE: 136 MMHG | DIASTOLIC BLOOD PRESSURE: 82 MMHG

## 2021-04-21 RX ADMIN — MULTIPLE VITAMINS W/ MINERALS TAB SCH TAB: TAB at 09:28

## 2021-04-21 RX ADMIN — OXYCODONE HYDROCHLORIDE AND ACETAMINOPHEN SCH MG: 500 TABLET ORAL at 09:28

## 2021-04-21 RX ADMIN — POLYETHYLENE GLYCOL 3350 SCH GM: 17 POWDER, FOR SOLUTION ORAL at 09:28

## 2021-04-21 RX ADMIN — LEVOTHYROXINE SODIUM SCH MCG: 50 TABLET ORAL at 05:54

## 2021-04-21 RX ADMIN — NICOTINE SCH PATCH: 7 PATCH TRANSDERMAL at 09:28

## 2021-04-21 RX ADMIN — BUSPIRONE HYDROCHLORIDE SCH MG: 5 TABLET ORAL at 09:28

## 2021-04-21 RX ADMIN — QUETIAPINE FUMARATE SCH MG: 50 TABLET, FILM COATED ORAL at 20:14

## 2021-04-21 RX ADMIN — SERTRALINE HYDROCHLORIDE SCH MG: 50 TABLET ORAL at 09:28

## 2021-04-21 RX ADMIN — DIVALPROEX SODIUM SCH MG: 125 CAPSULE, COATED PELLETS ORAL at 17:43

## 2021-04-21 RX ADMIN — QUETIAPINE FUMARATE SCH MG: 50 TABLET, FILM COATED ORAL at 09:28

## 2021-04-21 RX ADMIN — BUSPIRONE HYDROCHLORIDE SCH MG: 5 TABLET ORAL at 20:14

## 2021-04-21 RX ADMIN — DIVALPROEX SODIUM SCH MG: 125 CAPSULE, COATED PELLETS ORAL at 09:28

## 2021-04-21 RX ADMIN — Medication SCH CAP: at 20:14

## 2021-04-21 RX ADMIN — Medication SCH CAP: at 09:29

## 2021-04-21 RX ADMIN — BUSPIRONE HYDROCHLORIDE SCH MG: 5 TABLET ORAL at 13:01

## 2021-04-21 NOTE — NUR
Nursing Note

Pt pleasant and cooperative, calm no behaviors.  Up in day room social with peers. Watching 
TV.

## 2021-04-21 NOTE — NUR
ROB attempted outreach to pt facility today X3 to discuss d/c date. SW left vm with [facility]. 
At end of day SW attempted to call a last time before end of day, but no one answered 
facility phone. Awaiting call back from [facility]. ROB made contact with pt niece/DPOA, 
Marylou, to notify that pt was ready for d/c and that this SW would continue to outreach pt 
facility to schedule a d/c date and would then let her know when that date would be. 
Marylou stated that she had spoken to the facility about pt's hydrocephalus and that they 
were planning to have pt checked out by a neurosurgeon, as recommended, following d/c from 
[facility]. Marylou appreciative of call. Will call facility back tomorrow.

## 2021-04-21 NOTE — NUR
Nursing note:

Pt in day room at time of AM med pass and assessment. She is pleasant, compliant with meds 
whole and cooperative with her assessment. She denies having any pain at that time. She is 
currently sitting in the day room watching TV. Will continue to monitor.

## 2021-04-21 NOTE — PDOC
Exam


Note:


Hayden Note:


Please also refer to the separate dictated note~for this date of service 

dictated separately.~Patient seen individually. Discussed the patient with 

Nursing staff reviewed the chart.~Reviewed interim history and current 

functioning. Reviewed vital signs,~Labs/ Radiology~and current medications noted

below. Continue current treatment with the changes noted in the dictated 

addendum note





Assessment:


Vital Signs/I&O:





                                   Vital Signs








  Date Time  Temp Pulse Resp B/P (MAP) Pulse Ox O2 Delivery O2 Flow Rate FiO2


 


4/21/21 15:46 98.0 84 16 106/59 (75) 95   


 


4/17/21 16:21      Room Air  














                                    I & O   


 


 4/20/21 4/20/21 4/21/21





 15:00 23:00 07:00


 


Intake Total 840 ml 480 ml 


 


Balance 840 ml 480 ml 











Current Medications:


Meds:





Current Medications








 Medications


  (Trade)  Dose


 Ordered  Sig/Elizabeth


 Route


 PRN Reason  Start Time


 Stop Time Status Last Admin


Dose Admin


 


 Olanzapine


  (ZyPREXA ZYDIS)  2.5 mg  PRN Q2HR  PRN


 PO


 PSYCHOSIS  3/26/21 22:15


    4/1/21 06:54





 


 Trazodone HCl


  (Desyrel)  50 mg  PRN QHS  PRN


 PO


 INSOMNIA  3/26/21 22:15


    4/15/21 20:24





 


 Buspirone HCl


  (Buspar)  5 mg  TID


 PO


   3/27/21 09:00


    4/21/21 20:14





 


 Quetiapine


 Fumarate


  (SEROquel)  50 mg  BID


 PO


   3/27/21 09:00


    4/21/21 20:14





 


 Ascorbic Acid


  (Vitamin C)  500 mg  DAILY


 PO


   3/27/21 09:00


    4/21/21 09:28





 


 Levothyroxine


 Sodium


  (Synthroid)  88 mcg  DAILY06


 PO


   3/27/21 06:00


 3/28/21 12:05 DC 3/27/21 04:40





 


 Multivitamins/


 Calcium


  (Thera-M Plus)  1 tab  DAILY


 PO


   3/27/21 09:00


    4/21/21 09:28





 


 Acetaminophen


  (Tylenol)  650 mg  PRN Q6HRS  PRN


 PO


 MILD PAIN / TEMP > 100.3'F  3/26/21 23:45


     





 


 Multi-Ingredient


 Ointment


  (Analgesic Balm)  1 chon  PRN QID  PRN


 TP


 MUSCLE PAIN  3/26/21 23:45


     





 


 Al Hydroxide/Mg


 Hydroxide


  (Mylanta Plus Xs)  15 ml  PRN AFTMEALHC  PRN


 PO


 DYSPEPSIA  3/26/21 23:45


     





 


 Magnesium


 Hydroxide


  (Milk Of


 Magnesia)  2,400 mg  PRN QHS  PRN


 PO


 CONSTIPATION  3/26/21 23:45


     





 


 Nicotine


  (Nicoderm Cq


 14mg Patch)  1 patch  DAILY


 TD


   3/27/21 09:00


 4/8/21 22:52 DC 4/8/21 08:39





 


 Levothyroxine


 Sodium


  (Synthroid)  50 mcg  DAILY06


 PO


   3/29/21 06:00


    4/21/21 05:54





 


 Amoxicillin


  (Amoxil)  250 mg  [id number]


 PO


   3/29/21 21:00


 4/5/21 21:00 DC 4/5/21 20:13





 


 Clonazepam


  (KlonoPIN)  0.5 mg  BID


 PO


   3/31/21 21:00


 4/3/21 17:33 DC 4/3/21 08:23





 


 Diazepam


  (Valium)  5 mg  1X  ONCE


 PO


   4/1/21 06:00


 4/1/21 06:01 DC 4/1/21 06:00





 


 Lactobacillus


 Rhamnosus


  (Culturelle)  1 cap  BID


 PO


   4/1/21 09:00


    4/21/21 20:14





 


 Nicotine


  (Nicoderm Cq 7mg


 Patch)  1 patch  DAILY


 TD


   4/9/21 09:00


 4/23/21 11:00  4/21/21 09:28





 


 Polyethylene


 Glycol


  (miraLAX)  17 gm  DAILY


 PO


   4/10/21 09:00


    4/21/21 09:28





 


 Sertraline HCl


  (Zoloft)  25 mg  DAILY


 PO


   4/14/21 09:00


 4/16/21 23:50 DC 4/16/21 08:30





 


 Sertraline HCl


  (Zoloft)  50 mg  DAILY


 PO


   4/17/21 09:00


    4/21/21 09:28





 


 Divalproex Sodium


  (Depakote


 Sprinkles)  125 mg  DAILY08


 PO


   4/17/21 08:00


    4/21/21 09:28





 


 Divalproex Sodium


  (Depakote


 Sprinkles)  125 mg  DAILYWSUP


 PO


   4/17/21 17:00


    4/21/21 17:43











I have reviewed the current psychotropics carefully including drug interactions.

 Risk benefit ratio favors no change other than as noted in my dictated progress

note.





Diagnosis:


Problems:  


(1) Bipolar affective disorder, mixed


(2) Anxiety disorder, unspecified


(3) Impulse control disorder, unspecified


(4) Dementia, vascular, with depression


(5) Dementia, vascular, with delusions


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 

disturbance


(9) Major neurocognitive disorder











WILTON JESUS MD                 Apr 21, 2021 22:29

## 2021-04-22 VITALS — DIASTOLIC BLOOD PRESSURE: 84 MMHG | SYSTOLIC BLOOD PRESSURE: 133 MMHG

## 2021-04-22 RX ADMIN — Medication SCH CAP: at 05:12

## 2021-04-22 RX ADMIN — SERTRALINE HYDROCHLORIDE SCH MG: 50 TABLET ORAL at 05:11

## 2021-04-22 RX ADMIN — MULTIPLE VITAMINS W/ MINERALS TAB SCH TAB: TAB at 05:11

## 2021-04-22 RX ADMIN — POLYETHYLENE GLYCOL 3350 SCH GM: 17 POWDER, FOR SOLUTION ORAL at 05:10

## 2021-04-22 RX ADMIN — Medication SCH CAP: at 20:58

## 2021-04-22 RX ADMIN — BUSPIRONE HYDROCHLORIDE SCH MG: 5 TABLET ORAL at 14:32

## 2021-04-22 RX ADMIN — NICOTINE SCH PATCH: 7 PATCH TRANSDERMAL at 05:11

## 2021-04-22 RX ADMIN — DIVALPROEX SODIUM SCH MG: 125 CAPSULE, COATED PELLETS ORAL at 17:50

## 2021-04-22 RX ADMIN — BUSPIRONE HYDROCHLORIDE SCH MG: 5 TABLET ORAL at 05:11

## 2021-04-22 RX ADMIN — QUETIAPINE FUMARATE SCH MG: 50 TABLET, FILM COATED ORAL at 20:58

## 2021-04-22 RX ADMIN — DIVALPROEX SODIUM SCH MG: 125 CAPSULE, COATED PELLETS ORAL at 05:11

## 2021-04-22 RX ADMIN — BUSPIRONE HYDROCHLORIDE SCH MG: 5 TABLET ORAL at 20:58

## 2021-04-22 RX ADMIN — QUETIAPINE FUMARATE SCH MG: 50 TABLET, FILM COATED ORAL at 05:11

## 2021-04-22 RX ADMIN — LEVOTHYROXINE SODIUM SCH MCG: 50 TABLET ORAL at 05:10

## 2021-04-22 RX ADMIN — OXYCODONE HYDROCHLORIDE AND ACETAMINOPHEN SCH MG: 500 TABLET ORAL at 05:12

## 2021-04-22 NOTE — NUR
[facility]

Social Work Discharge Planning Form



Patient Name TREVOR SWANSON

Admit Date: 3/26/21

DISCHARGE PLAN

Discharge Destination: [facility]

Care Assessment: No

Level II Assessment: No

Transportation: [facility] will p/u at 1100

Special Instructions/Notes: Please fax discharge orders and medication list.



DISCHARGE TO FACILITY

Facility: [facility]

Phone: [phone]

Fax: [phone]

[address] [facility] [place] [address]

Contact Name: ROB Coronel

PCP: Dr. Mccoy  

Psychiatrist: Dr. Pino

## 2021-04-22 NOTE — PDOC
Exam


Note:


Hayden Note:


Please also refer to the separate dictated note~for this date of service 

dictated separately.~Patient seen individually. Discussed the patient with 

Nursing staff reviewed the chart.~Reviewed interim history and current 

functioning. Reviewed vital signs,~Labs/ Radiology~and current medications noted

below. Continue current treatment with the changes noted in the dictated 

addendum note





Assessment:


Vital Signs/I&O:





                                   Vital Signs








  Date Time  Temp Pulse Resp B/P (MAP) Pulse Ox O2 Delivery O2 Flow Rate FiO2


 


4/22/21 16:18 98.8 84 20  96   


 


4/22/21 05:57    133/84 (100)    


 


4/17/21 16:21      Room Air  














                                    I & O   


 


 4/21/21 4/21/21 4/22/21





 15:00 23:00 07:00


 


Intake Total 480 ml 600 ml 


 


Balance 480 ml 600 ml 











Current Medications:


Meds:





Current Medications








 Medications


  (Trade)  Dose


 Ordered  Sig/Elizabeth


 Route


 PRN Reason  Start Time


 Stop Time Status Last Admin


Dose Admin


 


 Olanzapine


  (ZyPREXA ZYDIS)  2.5 mg  PRN Q2HR  PRN


 PO


 PSYCHOSIS  3/26/21 22:15


    4/1/21 06:54





 


 Trazodone HCl


  (Desyrel)  50 mg  PRN QHS  PRN


 PO


 INSOMNIA  3/26/21 22:15


    4/15/21 20:24





 


 Buspirone HCl


  (Buspar)  5 mg  TID


 PO


   3/27/21 09:00


    4/22/21 20:58





 


 Quetiapine


 Fumarate


  (SEROquel)  50 mg  BID


 PO


   3/27/21 09:00


    4/22/21 20:58





 


 Ascorbic Acid


  (Vitamin C)  500 mg  DAILY


 PO


   3/27/21 09:00


    4/22/21 05:12





 


 Levothyroxine


 Sodium


  (Synthroid)  88 mcg  DAILY06


 PO


   3/27/21 06:00


 3/28/21 12:05 DC 3/27/21 04:40





 


 Multivitamins/


 Calcium


  (Thera-M Plus)  1 tab  DAILY


 PO


   3/27/21 09:00


    4/22/21 05:11





 


 Acetaminophen


  (Tylenol)  650 mg  PRN Q6HRS  PRN


 PO


 MILD PAIN / TEMP > 100.3'F  3/26/21 23:45


     





 


 Multi-Ingredient


 Ointment


  (Analgesic Balm)  1 chon  PRN QID  PRN


 TP


 MUSCLE PAIN  3/26/21 23:45


     





 


 Al Hydroxide/Mg


 Hydroxide


  (Mylanta Plus Xs)  15 ml  PRN AFTMEALHC  PRN


 PO


 DYSPEPSIA  3/26/21 23:45


     





 


 Magnesium


 Hydroxide


  (Milk Of


 Magnesia)  2,400 mg  PRN QHS  PRN


 PO


 CONSTIPATION  3/26/21 23:45


     





 


 Nicotine


  (Nicoderm Cq


 14mg Patch)  1 patch  DAILY


 TD


   3/27/21 09:00


 4/8/21 22:52 DC 4/8/21 08:39





 


 Levothyroxine


 Sodium


  (Synthroid)  50 mcg  DAILY06


 PO


   3/29/21 06:00


    4/22/21 05:10





 


 Amoxicillin


  (Amoxil)  250 mg  [id number]


 PO


   3/29/21 21:00


 4/5/21 21:00 DC 4/5/21 20:13





 


 Clonazepam


  (KlonoPIN)  0.5 mg  BID


 PO


   3/31/21 21:00


 4/3/21 17:33 DC 4/3/21 08:23





 


 Diazepam


  (Valium)  5 mg  1X  ONCE


 PO


   4/1/21 06:00


 4/1/21 06:01 DC 4/1/21 06:00





 


 Lactobacillus


 Rhamnosus


  (Culturelle)  1 cap  BID


 PO


   4/1/21 09:00


    4/22/21 20:58





 


 Nicotine


  (Nicoderm Cq 7mg


 Patch)  1 patch  DAILY


 TD


   4/9/21 09:00


 4/23/21 11:00  4/22/21 05:11





 


 Polyethylene


 Glycol


  (miraLAX)  17 gm  DAILY


 PO


   4/10/21 09:00


    4/22/21 05:10





 


 Sertraline HCl


  (Zoloft)  25 mg  DAILY


 PO


   4/14/21 09:00


 4/16/21 23:50 DC 4/16/21 08:30





 


 Sertraline HCl


  (Zoloft)  50 mg  DAILY


 PO


   4/17/21 09:00


    4/22/21 05:11





 


 Divalproex Sodium


  (Depakote


 Sprinkles)  125 mg  DAILY08


 PO


   4/17/21 08:00


    4/22/21 05:11





 


 Divalproex Sodium


  (Depakote


 Sprinkles)  125 mg  DAILYWSUP


 PO


   4/17/21 17:00


    4/22/21 17:50











I have reviewed the current psychotropics carefully including drug interactions.

 Risk benefit ratio favors no change other than as noted in my dictated progress

note.





Diagnosis:


Problems:  


(1) Bipolar affective disorder, mixed


(2) Anxiety disorder, unspecified


(3) Impulse control disorder, unspecified


(4) Dementia, vascular, with depression


(5) Dementia, vascular, with delusions


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 

disturbance


(9) Major neurocognitive disorder











WILTON JESUS MD                 Apr 22, 2021 21:59

## 2021-04-22 NOTE — NUR
Nursing Note

Pt woke up angry, belligerent, dropping F-bombs, yelling out at no one in particular.  
Medicated patient and encouraged her to go back to sleep to hopefully sleep  off her 
aggression.

## 2021-04-22 NOTE — NUR
Patient has been calm, disorganized, and pleasantly confused during this shift.  She was 
withdrawn to her room most of the day.  She did have a couple periods of agitation which 
were redirected verbally.  Will continue to monitor and report to oncoming shift.

## 2021-04-22 NOTE — NUR
Patient is sitting outside of the [facility] nurses station on assumption of care, waiting to go 
home. She knows she is discharging tomorrow and is excited to leave. Compliant with 
assessments and medications taken whole. No agitation. She does not appear to be 
experiencing any pain or discomfort. Patient appears to be sleeping comfortably at present 
time. Will continue to monitor.

## 2021-04-23 VITALS — SYSTOLIC BLOOD PRESSURE: 110 MMHG | DIASTOLIC BLOOD PRESSURE: 76 MMHG

## 2021-04-23 RX ADMIN — QUETIAPINE FUMARATE SCH MG: 50 TABLET, FILM COATED ORAL at 08:14

## 2021-04-23 RX ADMIN — NICOTINE SCH PATCH: 7 PATCH TRANSDERMAL at 08:15

## 2021-04-23 RX ADMIN — Medication SCH CAP: at 08:14

## 2021-04-23 RX ADMIN — LEVOTHYROXINE SODIUM SCH MCG: 50 TABLET ORAL at 05:26

## 2021-04-23 RX ADMIN — SERTRALINE HYDROCHLORIDE SCH MG: 50 TABLET ORAL at 08:14

## 2021-04-23 RX ADMIN — DIVALPROEX SODIUM SCH MG: 125 CAPSULE, COATED PELLETS ORAL at 08:14

## 2021-04-23 RX ADMIN — POLYETHYLENE GLYCOL 3350 SCH GM: 17 POWDER, FOR SOLUTION ORAL at 08:15

## 2021-04-23 RX ADMIN — MULTIPLE VITAMINS W/ MINERALS TAB SCH TAB: TAB at 08:14

## 2021-04-23 RX ADMIN — BUSPIRONE HYDROCHLORIDE SCH MG: 5 TABLET ORAL at 08:14

## 2021-04-23 RX ADMIN — OXYCODONE HYDROCHLORIDE AND ACETAMINOPHEN SCH MG: 500 TABLET ORAL at 08:14

## 2021-04-23 NOTE — PDOC
Exam


Note:


Hayden Note:


This note is a late entry for 04/22/2021 covers elements not covered in my 

initial note.





Subjective:  The patient was reviewed in the morning of 04/22/2021 for a 

treatment team meeting with Margarita Hsu, Bhavani Christensen and Shahnaz (social 

services), Kiarra, activity therapy and Constantino JUDD, discussed and reviewed the 

chart.  The patient slept 4-3/4 hours previous night.  She is oriented to 

herself.  No hallucinations  noted.  She easily gets over-stimulated in the 

dayroom.  She received Klonopin at 5 a.m. due to anxiety.





Review of Systems:  No CV, , pulmonary, eye, ENT system symptoms on review.





Mental Status Exam:  The patient is oriented to herself and situation.  She has 

rapid speech.  Insight and judgment, recent and remote memory, attention and 

concentration, fund of knowledge is poor consistent with her diagnoses.  No 

suicidal or homicidal ideation.





Laboratory Data:  Reviewed.





Impression:  Major neurocognitive disorder Alzheimer vascular with delusion, 

depression, behavioral disturbance.  Anxiety disorder unspecified.  Impulse 

control disorder unspecified.





Plan:  Maintain current psychotropics unchanged.





Assessment:


Vital Signs/I&O:





                                   Vital Signs








  Date Time  Temp Pulse Resp B/P (MAP) Pulse Ox O2 Delivery O2 Flow Rate FiO2


 


4/23/21 06:00 96.9 70 20 110/76 (87) 94   


 


4/17/21 16:21      Room Air  














                                    I & O   


 


 4/22/21 4/22/21 4/23/21





 15:00 23:00 07:00


 


Intake Total 600 ml 360 ml 


 


Balance 600 ml 360 ml 











Current Medications:


Meds:





Current Medications








 Medications


  (Trade)  Dose


 Ordered  Sig/Elizabeth


 Route


 PRN Reason  Start Time


 Stop Time Status Last Admin


Dose Admin


 


 Olanzapine


  (ZyPREXA ZYDIS)  2.5 mg  PRN Q2HR  PRN


 PO


 PSYCHOSIS  3/26/21 22:15


    4/1/21 06:54





 


 Trazodone HCl


  (Desyrel)  50 mg  PRN QHS  PRN


 PO


 INSOMNIA  3/26/21 22:15


    4/15/21 20:24





 


 Buspirone HCl


  (Buspar)  5 mg  TID


 PO


   3/27/21 09:00


    4/22/21 20:58





 


 Quetiapine


 Fumarate


  (SEROquel)  50 mg  BID


 PO


   3/27/21 09:00


    4/22/21 20:58





 


 Ascorbic Acid


  (Vitamin C)  500 mg  DAILY


 PO


   3/27/21 09:00


    4/22/21 05:12





 


 Levothyroxine


 Sodium


  (Synthroid)  88 mcg  DAILY06


 PO


   3/27/21 06:00


 3/28/21 12:05 DC 3/27/21 04:40





 


 Multivitamins/


 Calcium


  (Thera-M Plus)  1 tab  DAILY


 PO


   3/27/21 09:00


    4/22/21 05:11





 


 Acetaminophen


  (Tylenol)  650 mg  PRN Q6HRS  PRN


 PO


 MILD PAIN / TEMP > 100.3'F  3/26/21 23:45


     





 


 Multi-Ingredient


 Ointment


  (Analgesic Balm)  1 chon  PRN QID  PRN


 TP


 MUSCLE PAIN  3/26/21 23:45


     





 


 Al Hydroxide/Mg


 Hydroxide


  (Mylanta Plus Xs)  15 ml  PRN AFTMEALHC  PRN


 PO


 DYSPEPSIA  3/26/21 23:45


     





 


 Magnesium


 Hydroxide


  (Milk Of


 Magnesia)  2,400 mg  PRN QHS  PRN


 PO


 CONSTIPATION  3/26/21 23:45


     





 


 Nicotine


  (Nicoderm Cq


 14mg Patch)  1 patch  DAILY


 TD


   3/27/21 09:00


 4/8/21 22:52 DC 4/8/21 08:39





 


 Levothyroxine


 Sodium


  (Synthroid)  50 mcg  DAILY06


 PO


   3/29/21 06:00


    4/23/21 05:26





 


 Amoxicillin


  (Amoxil)  250 mg  [id number]


 PO


   3/29/21 21:00


 4/5/21 21:00 DC 4/5/21 20:13





 


 Clonazepam


  (KlonoPIN)  0.5 mg  BID


 PO


   3/31/21 21:00


 4/3/21 17:33 DC 4/3/21 08:23





 


 Diazepam


  (Valium)  5 mg  1X  ONCE


 PO


   4/1/21 06:00


 4/1/21 06:01 DC 4/1/21 06:00





 


 Lactobacillus


 Rhamnosus


  (Culturelle)  1 cap  BID


 PO


   4/1/21 09:00


    4/22/21 20:58





 


 Nicotine


  (Nicoderm Cq 7mg


 Patch)  1 patch  DAILY


 TD


   4/9/21 09:00


 4/23/21 11:00  4/22/21 05:11





 


 Polyethylene


 Glycol


  (miraLAX)  17 gm  DAILY


 PO


   4/10/21 09:00


    4/22/21 05:10





 


 Sertraline HCl


  (Zoloft)  25 mg  DAILY


 PO


   4/14/21 09:00


 4/16/21 23:50 DC 4/16/21 08:30





 


 Sertraline HCl


  (Zoloft)  50 mg  DAILY


 PO


   4/17/21 09:00


    4/22/21 05:11





 


 Divalproex Sodium


  (Depakote


 Sprinkles)  125 mg  DAILY08


 PO


   4/17/21 08:00


    4/22/21 05:11





 


 Divalproex Sodium


  (Depakote


 Sprinkles)  125 mg  DAILYWSUP


 PO


   4/17/21 17:00


    4/22/21 17:50











I have reviewed the current psychotropics carefully including drug interactions.

 Risk benefit ratio favors no change other than as noted in my dictated progress

note.





Diagnosis:


Problems:  


(1) Bipolar affective disorder, mixed


(2) Anxiety disorder, unspecified


(3) Impulse control disorder, unspecified


(4) Dementia, vascular, with depression


(5) Dementia, vascular, with delusions


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 

disturbance


(9) Major neurocognitive disorder











WILTON JESUS MD                 Apr 23, 2021 07:44

## 2021-04-23 NOTE — PDOC
Exam


Note:


Hayden Note:


This note is a late entry for 04/20/2021 covers elements not covered in my 

initial note.





Subjective:  The patient was seen individually in the evening of 04/20/2021 with

Sharla JUDD, discussed and reviewed the chart.  The patient slept 7 hours 

previous night.  Per nursing report the patient has been doing wonderful.  She

remains confused, somewhat rambling in her speech but redirectable.  She has a 

great singing voice.





Review of Systems:  No CV, , pulmonary, eye, ENT system symptoms on review.  

She does have the abnormal movements.





Mental Status Exam:  The patient is oriented to herself and situation.  Insight 

and judgment, recent and remote memory, attention and concentration, fund of 

knowledge is poor consistent with her diagnoses.





Laboratory Data:  Reviewed.





Impression:  Major neurocognitive disorder Alzheimer vascular with delusion, 

depression, behavioral disturbance.  Anxiety disorder unspecified.  Impulse 

control disorder unspecified.





Plan:  Maintain current psychotropics unchanged.





Assessment:


Vital Signs/I&O:





                                   Vital Signs








  Date Time  Temp Pulse Resp B/P (MAP) Pulse Ox O2 Delivery O2 Flow Rate FiO2


 


4/23/21 06:00 96.9 70 20 110/76 (87) 94   


 


4/17/21 16:21      Room Air  














                                    I & O   


 


 4/22/21 4/22/21 4/23/21





 15:00 23:00 07:00


 


Intake Total 600 ml 360 ml 


 


Balance 600 ml 360 ml 











Current Medications:


Meds:





Current Medications








 Medications


  (Trade)  Dose


 Ordered  Sig/Elizabeth


 Route


 PRN Reason  Start Time


 Stop Time Status Last Admin


Dose Admin


 


 Olanzapine


  (ZyPREXA ZYDIS)  2.5 mg  PRN Q2HR  PRN


 PO


 PSYCHOSIS  3/26/21 22:15


    4/1/21 06:54





 


 Trazodone HCl


  (Desyrel)  50 mg  PRN QHS  PRN


 PO


 INSOMNIA  3/26/21 22:15


    4/15/21 20:24





 


 Buspirone HCl


  (Buspar)  5 mg  TID


 PO


   3/27/21 09:00


    4/22/21 20:58





 


 Quetiapine


 Fumarate


  (SEROquel)  50 mg  BID


 PO


   3/27/21 09:00


    4/22/21 20:58





 


 Ascorbic Acid


  (Vitamin C)  500 mg  DAILY


 PO


   3/27/21 09:00


    4/22/21 05:12





 


 Levothyroxine


 Sodium


  (Synthroid)  88 mcg  DAILY06


 PO


   3/27/21 06:00


 3/28/21 12:05 DC 3/27/21 04:40





 


 Multivitamins/


 Calcium


  (Thera-M Plus)  1 tab  DAILY


 PO


   3/27/21 09:00


    4/22/21 05:11





 


 Acetaminophen


  (Tylenol)  650 mg  PRN Q6HRS  PRN


 PO


 MILD PAIN / TEMP > 100.3'F  3/26/21 23:45


     





 


 Multi-Ingredient


 Ointment


  (Analgesic Balm)  1 chon  PRN QID  PRN


 TP


 MUSCLE PAIN  3/26/21 23:45


     





 


 Al Hydroxide/Mg


 Hydroxide


  (Mylanta Plus Xs)  15 ml  PRN AFTMEALHC  PRN


 PO


 DYSPEPSIA  3/26/21 23:45


     





 


 Magnesium


 Hydroxide


  (Milk Of


 Magnesia)  2,400 mg  PRN QHS  PRN


 PO


 CONSTIPATION  3/26/21 23:45


     





 


 Nicotine


  (Nicoderm Cq


 14mg Patch)  1 patch  DAILY


 TD


   3/27/21 09:00


 4/8/21 22:52 DC 4/8/21 08:39





 


 Levothyroxine


 Sodium


  (Synthroid)  50 mcg  DAILY06


 PO


   3/29/21 06:00


    4/23/21 05:26





 


 Amoxicillin


  (Amoxil)  250 mg  [id number]


 PO


   3/29/21 21:00


 4/5/21 21:00 DC 4/5/21 20:13





 


 Clonazepam


  (KlonoPIN)  0.5 mg  BID


 PO


   3/31/21 21:00


 4/3/21 17:33 DC 4/3/21 08:23





 


 Diazepam


  (Valium)  5 mg  1X  ONCE


 PO


   4/1/21 06:00


 4/1/21 06:01 DC 4/1/21 06:00





 


 Lactobacillus


 Rhamnosus


  (Culturelle)  1 cap  BID


 PO


   4/1/21 09:00


    4/22/21 20:58





 


 Nicotine


  (Nicoderm Cq 7mg


 Patch)  1 patch  DAILY


 TD


   4/9/21 09:00


 4/23/21 11:00  4/22/21 05:11





 


 Polyethylene


 Glycol


  (miraLAX)  17 gm  DAILY


 PO


   4/10/21 09:00


    4/22/21 05:10





 


 Sertraline HCl


  (Zoloft)  25 mg  DAILY


 PO


   4/14/21 09:00


 4/16/21 23:50 DC 4/16/21 08:30





 


 Sertraline HCl


  (Zoloft)  50 mg  DAILY


 PO


   4/17/21 09:00


    4/22/21 05:11





 


 Divalproex Sodium


  (Depakote


 Sprinkles)  125 mg  DAILY08


 PO


   4/17/21 08:00


    4/22/21 05:11





 


 Divalproex Sodium


  (Depakote


 Sprinkles)  125 mg  DAILYWSUP


 PO


   4/17/21 17:00


    4/22/21 17:50











I have reviewed the current psychotropics carefully including drug interactions.

 Risk benefit ratio favors no change other than as noted in my dictated progress

note.





Diagnosis:


Problems:  


(1) Bipolar affective disorder, mixed


(2) Anxiety disorder, unspecified


(3) Impulse control disorder, unspecified


(4) Dementia, vascular, with depression


(5) Dementia, vascular, with delusions


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 

disturbance


(9) Major neurocognitive disorder











WILTON JESUS MD                 Apr 23, 2021 07:04

## 2021-04-23 NOTE — PDOC
Exam


Note:


Hayden Note:


This note is a late entry for 04/21/2021 covers elements not covered in my 

initial note.





Subjective:  The patient was seen individually in the evening of 04/21/2021 with

Sharla JUDD, discussed and reviewed the chart.  The patient slept 6 hours 

previous night.  She was somewhat irritable in the morning, restless, anxious 

but then did okay rest of the day.  I met with her in the dayroom.





Review of Systems:  No CV, , pulmonary, eye, ENT system symptoms on review.





Mental Status Exam:  The patient is oriented to herself and situation.  She has 

rapid speech, difficult to understand at times.  Insight and judgment, recent 

and remote memory, attention and concentration, fund of knowledge is poor 

consistent with her diagnoses.  No suicidal or homicidal ideation.





Laboratory Data:  Reviewed.





Impression:  Major neurocognitive disorder Alzheimer vascular with delusion, 

depression, behavioral disturbance.  Anxiety disorder unspecified.  Impulse con

trol disorder unspecified.





Plan:  Maintain current psychotropics unchanged.





Assessment:


Vital Signs/I&O:





                                   Vital Signs








  Date Time  Temp Pulse Resp B/P (MAP) Pulse Ox O2 Delivery O2 Flow Rate FiO2


 


4/23/21 06:00 96.9 70 20 110/76 (87) 94   


 


4/17/21 16:21      Room Air  














                                    I & O   


 


 4/22/21 4/22/21 4/23/21





 14:59 22:59 06:59


 


Intake Total 600 ml 360 ml 


 


Balance 600 ml 360 ml 











Current Medications:


Meds:





Current Medications








 Medications


  (Trade)  Dose


 Ordered  Sig/Elizabeth


 Route


 PRN Reason  Start Time


 Stop Time Status Last Admin


Dose Admin


 


 Olanzapine


  (ZyPREXA ZYDIS)  2.5 mg  PRN Q2HR  PRN


 PO


 PSYCHOSIS  3/26/21 22:15


    4/1/21 06:54





 


 Trazodone HCl


  (Desyrel)  50 mg  PRN QHS  PRN


 PO


 INSOMNIA  3/26/21 22:15


    4/15/21 20:24





 


 Buspirone HCl


  (Buspar)  5 mg  TID


 PO


   3/27/21 09:00


    4/22/21 20:58





 


 Quetiapine


 Fumarate


  (SEROquel)  50 mg  BID


 PO


   3/27/21 09:00


    4/22/21 20:58





 


 Ascorbic Acid


  (Vitamin C)  500 mg  DAILY


 PO


   3/27/21 09:00


    4/22/21 05:12





 


 Levothyroxine


 Sodium


  (Synthroid)  88 mcg  DAILY06


 PO


   3/27/21 06:00


 3/28/21 12:05 DC 3/27/21 04:40





 


 Multivitamins/


 Calcium


  (Thera-M Plus)  1 tab  DAILY


 PO


   3/27/21 09:00


    4/22/21 05:11





 


 Acetaminophen


  (Tylenol)  650 mg  PRN Q6HRS  PRN


 PO


 MILD PAIN / TEMP > 100.3'F  3/26/21 23:45


     





 


 Multi-Ingredient


 Ointment


  (Analgesic Balm)  1 chon  PRN QID  PRN


 TP


 MUSCLE PAIN  3/26/21 23:45


     





 


 Al Hydroxide/Mg


 Hydroxide


  (Mylanta Plus Xs)  15 ml  PRN AFTMEALHC  PRN


 PO


 DYSPEPSIA  3/26/21 23:45


     





 


 Magnesium


 Hydroxide


  (Milk Of


 Magnesia)  2,400 mg  PRN QHS  PRN


 PO


 CONSTIPATION  3/26/21 23:45


     





 


 Nicotine


  (Nicoderm Cq


 14mg Patch)  1 patch  DAILY


 TD


   3/27/21 09:00


 4/8/21 22:52 DC 4/8/21 08:39





 


 Levothyroxine


 Sodium


  (Synthroid)  50 mcg  DAILY06


 PO


   3/29/21 06:00


    4/23/21 05:26





 


 Amoxicillin


  (Amoxil)  250 mg  [id number]


 PO


   3/29/21 21:00


 4/5/21 21:00 DC 4/5/21 20:13





 


 Clonazepam


  (KlonoPIN)  0.5 mg  BID


 PO


   3/31/21 21:00


 4/3/21 17:33 DC 4/3/21 08:23





 


 Diazepam


  (Valium)  5 mg  1X  ONCE


 PO


   4/1/21 06:00


 4/1/21 06:01 DC 4/1/21 06:00





 


 Lactobacillus


 Rhamnosus


  (Culturelle)  1 cap  BID


 PO


   4/1/21 09:00


    4/22/21 20:58





 


 Nicotine


  (Nicoderm Cq 7mg


 Patch)  1 patch  DAILY


 TD


   4/9/21 09:00


 4/23/21 11:00  4/22/21 05:11





 


 Polyethylene


 Glycol


  (miraLAX)  17 gm  DAILY


 PO


   4/10/21 09:00


    4/22/21 05:10





 


 Sertraline HCl


  (Zoloft)  25 mg  DAILY


 PO


   4/14/21 09:00


 4/16/21 23:50 DC 4/16/21 08:30





 


 Sertraline HCl


  (Zoloft)  50 mg  DAILY


 PO


   4/17/21 09:00


    4/22/21 05:11





 


 Divalproex Sodium


  (Depakote


 Sprinkles)  125 mg  DAILY08


 PO


   4/17/21 08:00


    4/22/21 05:11





 


 Divalproex Sodium


  (Depakote


 Sprinkles)  125 mg  DAILYWSUP


 PO


   4/17/21 17:00


    4/22/21 17:50











I have reviewed the current psychotropics carefully including drug interactions.

 Risk benefit ratio favors no change other than as noted in my dictated progress

note.





Diagnosis:


Problems:  


(1) Bipolar affective disorder, mixed


(2) Anxiety disorder, unspecified


(3) Impulse control disorder, unspecified


(4) Dementia, vascular, with depression


(5) Dementia, vascular, with delusions


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 

disturbance


(9) Major neurocognitive disorder











WILTON JESUS MD                 Apr 23, 2021 07:24

## 2021-04-23 NOTE — NUR
Tobacco Discharge Note [facility]



Tobacco Hotline called with patient prior to discharge, [phone] 

No, patient refused

Tobacco cessation medication listed with current medications for discharge.

No, 2 week course completed



Transition Record was faxed to follow-up provider with the following elements:



Reason for admission, procedures, tests, principal diagnosis, pending studies, patient 
instructions, 24/7 contact information for unit, phone number to obtain pending test 
results, plan for follow-up care, physician follow-up, advanced directive information, and 
medication list with dose, duration and instructions.



This information was included in the following documents:



History and physical, lab results, study results, progress notes, social work planning form, 
DC instruction form, patient visit summary, and medication reconciliation form.



Date & time record faxed: 04:35 23 April 2021



Record faxed to: [facility]



Record discussed with/ report given to: Voicemail left at nurse's station.

-------------------------------------------------------------------------------

Addendum: 04/23/21 at 1155 by TIM QUINTERO II, RN

-------------------------------------------------------------------------------

Discharge report given to DUTCH Dominguez at [facility].

## 2021-04-23 NOTE — PDOC
Exam


Note:


Hayden Note:


Please also refer to the separate dictated note~for this date of service 

dictated separately.~Patient seen individually. Discussed the patient with 

Nursing staff reviewed the chart.~Reviewed interim history and current 

functioning. Reviewed vital signs,~Labs/ Radiology~and current medications noted

below. Continue current treatment with the changes noted in the dictated 

addendum note





Assessment:


Vital Signs/I&O:





                                   Vital Signs








  Date Time  Temp Pulse Resp B/P (MAP) Pulse Ox O2 Delivery O2 Flow Rate FiO2


 


4/23/21 06:00 96.9 70 20 110/76 (87) 94   


 


4/17/21 16:21      Room Air  














                                    I & O   


 


 4/22/21 4/22/21 4/23/21





 15:00 23:00 07:00


 


Intake Total 600 ml 360 ml 


 


Balance 600 ml 360 ml 











Current Medications:


Meds:





Current Medications








 Medications


  (Trade)  Dose


 Ordered  Sig/Elizabeth


 Route


 PRN Reason  Start Time


 Stop Time Status Last Admin


Dose Admin


 


 Olanzapine


  (ZyPREXA ZYDIS)  2.5 mg  PRN Q2HR  PRN


 PO


 PSYCHOSIS  3/26/21 22:15


 4/23/21 11:31 DC 4/1/21 06:54





 


 Trazodone HCl


  (Desyrel)  50 mg  PRN QHS  PRN


 PO


 INSOMNIA  3/26/21 22:15


 4/23/21 11:31 DC 4/15/21 20:24





 


 Buspirone HCl


  (Buspar)  5 mg  TID


 PO


   3/27/21 09:00


 4/23/21 11:31 DC 4/23/21 08:14





 


 Quetiapine


 Fumarate


  (SEROquel)  50 mg  BID


 PO


   3/27/21 09:00


 4/23/21 11:31 DC 4/23/21 08:14





 


 Ascorbic Acid


  (Vitamin C)  500 mg  DAILY


 PO


   3/27/21 09:00


 4/23/21 11:31 DC 4/23/21 08:14





 


 Levothyroxine


 Sodium


  (Synthroid)  88 mcg  DAILY06


 PO


   3/27/21 06:00


 3/28/21 12:05 DC 3/27/21 04:40





 


 Multivitamins/


 Calcium


  (Thera-M Plus)  1 tab  DAILY


 PO


   3/27/21 09:00


 4/23/21 11:31 DC 4/23/21 08:14





 


 Acetaminophen


  (Tylenol)  650 mg  PRN Q6HRS  PRN


 PO


 MILD PAIN / TEMP > 100.3'F  3/26/21 23:45


 4/23/21 11:31 DC  





 


 Multi-Ingredient


 Ointment


  (Analgesic Balm)  1 chon  PRN QID  PRN


 TP


 MUSCLE PAIN  3/26/21 23:45


 4/23/21 11:31 DC  





 


 Al Hydroxide/Mg


 Hydroxide


  (Mylanta Plus Xs)  15 ml  PRN AFTMEALHC  PRN


 PO


 DYSPEPSIA  3/26/21 23:45


 4/23/21 11:31 DC  





 


 Magnesium


 Hydroxide


  (Milk Of


 Magnesia)  2,400 mg  PRN QHS  PRN


 PO


 CONSTIPATION  3/26/21 23:45


 4/23/21 11:31 DC  





 


 Nicotine


  (Nicoderm Cq


 14mg Patch)  1 patch  DAILY


 TD


   3/27/21 09:00


 4/8/21 22:52 DC 4/8/21 08:39





 


 Levothyroxine


 Sodium


  (Synthroid)  50 mcg  DAILY06


 PO


   3/29/21 06:00


 4/23/21 11:31 DC 4/23/21 05:26





 


 Amoxicillin


  (Amoxil)  250 mg  GCO534


 PO


   3/29/21 21:00


 4/5/21 21:00 DC 4/5/21 20:13





 


 Clonazepam


  (KlonoPIN)  0.5 mg  BID


 PO


   3/31/21 21:00


 4/3/21 17:33 DC 4/3/21 08:23





 


 Diazepam


  (Valium)  5 mg  1X  ONCE


 PO


   4/1/21 06:00


 4/1/21 06:01 DC 4/1/21 06:00





 


 Lactobacillus


 Rhamnosus


  (Culturelle)  1 cap  BID


 PO


   4/1/21 09:00


 4/23/21 11:31 DC 4/23/21 08:14





 


 Nicotine


  (Nicoderm Cq 7mg


 Patch)  1 patch  DAILY


 TD


   4/9/21 09:00


 4/23/21 11:00 DC 4/23/21 08:15





 


 Polyethylene


 Glycol


  (miraLAX)  17 gm  DAILY


 PO


   4/10/21 09:00


 4/23/21 11:31 DC 4/23/21 08:15





 


 Sertraline HCl


  (Zoloft)  25 mg  DAILY


 PO


   4/14/21 09:00


 4/16/21 23:50 DC 4/16/21 08:30





 


 Sertraline HCl


  (Zoloft)  50 mg  DAILY


 PO


   4/17/21 09:00


 4/23/21 11:31 DC 4/23/21 08:14





 


 Divalproex Sodium


  (Depakote


 Sprinkles)  125 mg  DAILY08


 PO


   4/17/21 08:00


 4/23/21 11:31 DC 4/23/21 08:14





 


 Divalproex Sodium


  (Depakote


 Sprinkles)  125 mg  DAILYWSUP


 PO


   4/17/21 17:00


 4/23/21 11:31 DC 4/22/21 17:50











I have reviewed the current psychotropics carefully including drug interactions.

 Risk benefit ratio favors no change other than as noted in my dictated progress

note.





Diagnosis:


Problems:  


(1) Bipolar affective disorder, mixed


(2) Anxiety disorder, unspecified


(3) Impulse control disorder, unspecified


(4) Dementia, vascular, with depression


(5) Dementia, vascular, with delusions


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 

disturbance


(9) Major neurocognitive disorder











WILTON JESUS MD                 Apr 23, 2021 22:12

## 2021-04-24 NOTE — DS
DATE OF DISCHARGE:  04/23/2021



DISCHARGE SUMMARY/PSYCHIATRIC PROGRESS NOTE



This note covers the elements not covered in my initial note of 04/23/2021.



REASON FOR ADMISSION:  Please refer to the admission history for details. 

Briefly, the patient is a 70-year-old  female referred to us from [facility] by her primary care physician on account of increased

confusion, agitation and aggression.  She is combative and striking out at

staff, verbally abusive, belligerent, refusing cares and showers.  She had

failed outpatient psychiatric interventions resulting in this referral.



SIGNIFICANT FINDINGS AND CLINICAL COURSE:  Following admission, the patient was

seen daily individually by myself from a psychiatric standpoint, medical

followup with Dr. Velez/Dr. Sorensen.  The patient remains anxious, restless, and quite

confused.  She has significant movement disorder, but despite this was able to

function adequately.  She is quite anxious, restless with ongoing mood

vacillations.  She finally seemed to respond to a combination of BuSpar 5 mg

t.i.d., Seroquel 50 mg b.i.d., Zyprexa p.r.n., trazodone 50 mg at bedtime

p.r.n., Zoloft 50 mg a day, Depakote Sprinkles 125 mg b.i.d.  Valproic acid

level subtherapeutic at 19, but clinically adequate.



REVIEW OF SYSTEMS:  Prior to discharge on 04/23/2021, review of systems is

positive for the movement disorder.  No CV, , pulmonary, eye, ENT system

symptoms on review.



MENTAL STATUS EXAM:  Oriented to herself.  Insight, judgment, recent and remote

memory, attention, concentration, fund of knowledge poor, consistent with her

diagnoses.



FINAL DIAGNOSES:  Major neurocognitive disorder, Alzheimer, vascular with

delusion, depression, behavioral disturbance; anxiety disorder, unspecified;

impulse control disorder, unspecified.  Rest unchanged from admission.



DISCHARGE MEDICATIONS:  Please refer to the MRAD.



DISCHARGE INSTRUCTIONS:  Outpatient psychiatric and medical followup at the

nursing [place].



Time for discharge day management greater than 30 minutes.





______________________________

MAN CALI JESUS MD



DR:  NEAL/willow  JOB#:  [id number] / [id number]

DD:  04/23/2021 23:34  DT:  04/24/2021 00:44

## 2023-06-29 NOTE — NUR
NSG NOTE; CONFUSION



PT HAS SPENT MOST OF THE AFTERNOON IN HER ROOM. SHE CONT TO BE CONFUSED AND COMPLIANT dry, intact, no bleeding and no hematoma.